# Patient Record
Sex: MALE | Race: WHITE
[De-identification: names, ages, dates, MRNs, and addresses within clinical notes are randomized per-mention and may not be internally consistent; named-entity substitution may affect disease eponyms.]

---

## 2018-06-13 ENCOUNTER — HOSPITAL ENCOUNTER (OUTPATIENT)
Dept: HOSPITAL 75 - RAD | Age: 28
End: 2018-06-13
Attending: NURSE PRACTITIONER
Payer: MEDICAID

## 2018-06-13 DIAGNOSIS — N50.82: Primary | ICD-10-CM

## 2018-06-13 PROCEDURE — 76870 US EXAM SCROTUM: CPT

## 2018-06-13 NOTE — DIAGNOSTIC IMAGING REPORT
INDICATION: 

Right scrotal pain.



TECHNIQUE:  

Real-time, grayscale, color-flow, and duplex Doppler evaluation

of the scrotum was performed.



FINDINGS:

The right testicle measures 2.5 x 0.9 x 1.7 cm and the left

testicle measures 2.5 x 1.2 x 1.8 cm. Both testes demonstrate

fairly homogeneous echotexture. No discrete testicular mass is

identified. There is normal vascularity to both testes. This is

likely a hernia in the right inguinal region containing fat. No

definite herniated bowel loops are seen. No hydrocele is

detected. The epididymides are unremarkable.



IMPRESSION:

1. No evidence of testicular mass or vascular compromise.

2. Findings are suggestive of a right inguinal hernia containing

fat.



Dictated by: 



  Dictated on workstation # [id number]

## 2018-07-06 ENCOUNTER — HOSPITAL ENCOUNTER (OUTPATIENT)
Dept: HOSPITAL 75 - PREOP | Age: 28
End: 2018-07-06
Attending: SURGERY
Payer: MEDICAID

## 2018-07-06 VITALS — BODY MASS INDEX: 37.51 KG/M2 | HEIGHT: 76 IN | WEIGHT: 308 LBS

## 2018-07-06 DIAGNOSIS — K40.30: ICD-10-CM

## 2018-07-06 DIAGNOSIS — Z01.818: Primary | ICD-10-CM

## 2018-07-09 ENCOUNTER — HOSPITAL ENCOUNTER (OUTPATIENT)
Dept: HOSPITAL 75 - SDC | Age: 28
Discharge: HOME | End: 2018-07-09
Attending: SURGERY
Payer: MEDICAID

## 2018-07-09 VITALS — SYSTOLIC BLOOD PRESSURE: 125 MMHG | DIASTOLIC BLOOD PRESSURE: 80 MMHG

## 2018-07-09 VITALS — SYSTOLIC BLOOD PRESSURE: 117 MMHG | DIASTOLIC BLOOD PRESSURE: 62 MMHG

## 2018-07-09 VITALS — SYSTOLIC BLOOD PRESSURE: 137 MMHG | DIASTOLIC BLOOD PRESSURE: 98 MMHG

## 2018-07-09 VITALS — WEIGHT: 308 LBS | BODY MASS INDEX: 37.51 KG/M2 | HEIGHT: 76 IN

## 2018-07-09 VITALS — SYSTOLIC BLOOD PRESSURE: 122 MMHG | DIASTOLIC BLOOD PRESSURE: 90 MMHG

## 2018-07-09 DIAGNOSIS — Z79.899: ICD-10-CM

## 2018-07-09 DIAGNOSIS — E66.01: ICD-10-CM

## 2018-07-09 DIAGNOSIS — K40.30: Primary | ICD-10-CM

## 2018-07-09 DIAGNOSIS — J45.909: ICD-10-CM

## 2018-07-09 DIAGNOSIS — D17.6: ICD-10-CM

## 2018-07-09 LAB
BASOPHILS # BLD AUTO: 0.1 10^3/UL (ref 0–0.1)
BASOPHILS NFR BLD AUTO: 1 % (ref 0–10)
EOSINOPHIL # BLD AUTO: 0.2 10^3/UL (ref 0–0.3)
EOSINOPHIL NFR BLD AUTO: 2 % (ref 0–10)
ERYTHROCYTE [DISTWIDTH] IN BLOOD BY AUTOMATED COUNT: 13.8 % (ref 10–14.5)
HCT VFR BLD CALC: 42 % (ref 40–54)
HGB BLD-MCNC: 13.4 G/DL (ref 13.3–17.7)
LYMPHOCYTES # BLD AUTO: 2.2 X 10^3 (ref 1–4)
LYMPHOCYTES NFR BLD AUTO: 24 % (ref 12–44)
MANUAL DIFFERENTIAL PERFORMED BLD QL: NO
MCH RBC QN AUTO: 27 PG (ref 25–34)
MCHC RBC AUTO-ENTMCNC: 32 G/DL (ref 32–36)
MCV RBC AUTO: 85 FL (ref 80–99)
MONOCYTES # BLD AUTO: 0.9 X 10^3 (ref 0–1)
MONOCYTES NFR BLD AUTO: 9 % (ref 0–12)
NEUTROPHILS # BLD AUTO: 5.9 X 10^3 (ref 1.8–7.8)
NEUTROPHILS NFR BLD AUTO: 64 % (ref 42–75)
PLATELET # BLD: 292 10^3/UL (ref 130–400)
PMV BLD AUTO: 10 FL (ref 7.4–10.4)
RBC # BLD AUTO: 4.93 10^6/UL (ref 4.35–5.85)
WBC # BLD AUTO: 9.2 10^3/UL (ref 4.3–11)

## 2018-07-09 PROCEDURE — 85025 COMPLETE CBC W/AUTO DIFF WBC: CPT

## 2018-07-09 PROCEDURE — 94664 DEMO&/EVAL PT USE INHALER: CPT

## 2018-07-09 PROCEDURE — 82962 GLUCOSE BLOOD TEST: CPT

## 2018-07-09 PROCEDURE — 36415 COLL VENOUS BLD VENIPUNCTURE: CPT

## 2018-07-09 PROCEDURE — 87081 CULTURE SCREEN ONLY: CPT

## 2018-07-09 RX ADMIN — SODIUM CHLORIDE, SODIUM LACTATE, POTASSIUM CHLORIDE, AND CALCIUM CHLORIDE PRN MLS/HR: 600; 310; 30; 20 INJECTION, SOLUTION INTRAVENOUS at 08:00

## 2018-07-09 RX ADMIN — SODIUM CHLORIDE, SODIUM LACTATE, POTASSIUM CHLORIDE, AND CALCIUM CHLORIDE PRN MLS/HR: 600; 310; 30; 20 INJECTION, SOLUTION INTRAVENOUS at 13:10

## 2018-07-09 RX ADMIN — SODIUM CHLORIDE, SODIUM LACTATE, POTASSIUM CHLORIDE, AND CALCIUM CHLORIDE PRN MLS/HR: 600; 310; 30; 20 INJECTION, SOLUTION INTRAVENOUS at 09:50

## 2018-07-09 NOTE — XMS REPORT
[facility]

 Created on: 08/15/2016



SadiaKatianah

External Reference #: [id number]

: 1990

Sex: Male



Demographics







 Address  [address] [place]  [address]

 

 Home Phone  [phone]

 

 Preferred Language  Unknown

 

 Marital Status  Unknown

 

 [employer] Affiliation  Unknown

 

 Race  Unreported/Refused to Report

 

 Ethnic Group  Not  or 





Author







 Author  LIGIA GONZALEZ

 

 [facility]  eClinicalWorks

 

 Address  Unknown

 

 Phone  Unavailable







Care Team Providers







 Care Team Member Name  Role  Phone

 

 LIGIA GONZALEZ  CP  Unavailable



                                                                



Allergies

          No Known Allergies                                                   
                                     



Problems

          





 Problem Type  Condition  Code  Onset Dates  Condition Status

 

 Problem  Pleurisy without mention of effusion or current tuberculosis  511.0  
   Active

 

 Problem  Asthma, unspecified, unspecified status  493.90     Active

 

 Problem  Routine general medical examination at health care facility  V70.0   
  Active

 

 Problem  Dysthymia  F34.1     Active

 

 Problem  Gastroesophageal reflux disease without esophagitis  K21.9     Active

 

 Problem  Mild persistent asthma without complication  J45.30     Active

 

 Problem  Encounter for dental examination  Z01.20     Active

 

 Problem  Contact dermatitis and other eczema, due to unspecified cause  692.9 
    Active

 

 Problem  Right foot pain  M79.671     Active

 

 Problem  Over weight  E66.3     Active

 

 Problem  Other general medical examination for administrative purposes  V70.3 
    Active

 

 Problem  Need for prophylactic vaccination and inoculation, Influenza  V04.81 
    Active

 

 Problem  Other follow-up examination  V67.59     Active



                                                                               
                                                                               
                                                  



Medications

          





 Medication  Code System  Code  Instructions  Start Date  End Date  Status  
Dosage

 

 Acanya  NDC  [address]-44  1.2-2.5 % Externally twice a day  Aug 15, 2016    
    1 application to affected area



                                                                              



Results

          No Known Results                                                     
               



Summary Purpose

          eClinicalWorks Submission

## 2018-07-09 NOTE — XMS REPORT
[facility]

 Created on: 2018



Sadia, Seth

External Reference #: [id number]

: 1990

Sex: Male



Demographics







 Address  [address] [place]  [address]

 

 Preferred Language  Unknown

 

 Marital Status  Unknown

 

 [employer] Affiliation  Unknown

 

 Race  Unknown

 

 Ethnic Group  Unknown





Author







 Author  TESHA RAMIREZ

 

 [facility]

 

 Address  [address] [place]  [address]



 

 Phone  Unavailable







Care Team Providers







 Care Team Member Name  Role  Phone

 

 TESHA RAMIREZ  Unavailable  Unavailable







PROBLEMS







 Type  Condition  ICD9-CM Code  AGB12-ZJ Code  Onset Dates  Condition Status  
SNOMED Code

 

 Problem  Dysthymia     F34.1     Active  19387494

 

 Problem  Right foot pain     M79.671     Active  73527784

 

 Problem  Over weight     E66.3     Active  [id number]

 

 Problem  Gastroesophageal reflux disease without esophagitis     K21.9     
Active  [id number]

 

 Problem  Mild persistent asthma without complication     J45.30     Active  
[id number]







ALLERGIES







 Substance  Reaction  Event Type  Date  Status

 

 Penicillin V Potassium  anaphylaxis  Drug Allergy  05 Dec, 2017  Active







ENCOUNTERS







 Encounter  Location  Date  Diagnosis

 

 [facility]  [id number] [place] [address] [id number]     

 

 [address] [id number]  30 May, 
2018  Diarrhea of presumed infectious origin R19.7

 

 [facility]  [id number] [place] [address] 
[id number]  10 Apr, 2018  Encounter for dental exam and cleaning w/o abnormal 
findings Z01.20

 

 [facility]  [address] [place] [address] [id number]    Acute nasopharyngitis J00

 

 [facility]  [id number] [place] [address] [id number]  13 2018  Mild persistent asthma without complication J45.30 ; Dysthymia F34.1 and 
Gastroesophageal reflux disease without esophagitis K21.9

 

 [address] [id number]  08 2018  Right foot pain M79.671 ; Encounter for screening for lipoid disorders 
Z13.220 and Encounter for screening for cardiovascular disorders Z13.6

 

 [id number] [place] [phone]  15 Dionte, 
2018   

 

 [facility]  [address] [id number][place] [phone]  15 [address], 
2018   

 

 [facility]  [address] [id number] [place] [phone]  
05 Dec, 2017  Dental examination Z01.20

 

 [facility]  [address] [id number], [place] 
[id number]  05 Dec, 2017  Dental examination Z01.20

 

 [facility]  [address] [id number], [place] [id number]    Encounter for immunization Z23

 

 [facility]  [address] [id number] [place] [phone]-
[phone]  16 Aug, 2017   

 

 [facility]  [address] [id number], [place] 
[phone]  15 Aug, 2017  Encounter for dental examination and cleaning without 
abnormal findings Z01.20

 

 [facility]  [address]00565100[place] [address]-
[address]  10 Aug, 2017   

 

 [facility]  [address] [id number], [place] [phone]  10 Aug, 
2017  Right foot pain M79.671 ; Dysthymia F34.1 ; Mild persistent asthma 
without complication J45.30 ; Gastroesophageal reflux disease without 
esophagitis K21.9 ; Encounter for screening for lipoid disorders Z13.220 ; 
Encounter for screening for cardiovascular disorders Z13.6 and Acne vulgaris 
L70.0

 

 [facility] [address]ANG[address] [id number] Rapids[place] [id number]  
  Dental examination Z01.20

 

 [facility]  [address] [id number] Rapids, [place] [phone]  
24 May, 2017  Dental examination Z01.20

 

 [facility]  [address] [id number], [place] 
[id number]  24 May, 2017  Encounter for dental examination and cleaning without 
abnormal findings Z01.20

 

 [facility]  [address] [id number], [place] [phone]    Dysthymia F34.1 ; Mild persistent asthma without complication J45.30 and 
Gastroesophageal reflux disease without esophagitis K21.9

 

 [facility]  [address] [id number], [place] 
[phone]    Encounter for dental examination and cleaning without 
abnormal findings Z01.20

 

 [facility]  [address] [id number], [place] [phone]    Physical exam, routine Z00.00

 

 [facility]  [address] [id number] [place] [phone]    Dysthymia F34.1

 

 [facility]  [address] [id number] [place] [address]  
20 Dec, 2016  Dental examination Z01.20

 

 [facility]  [address] [id number] [place] [address] [phone]  01 Dec, 
2016   

 

 [facility]  [address] [id number], [place] 
[id number]  27 Oct, 2016  Encounter for dental examination and cleaning without 
abnormal findings Z01.20

 

 [facility]  [address] [id number] Rapids[place] [phone]  
27 Oct, 2016  Encounter for dental examination Z01.20

 

 [facility]  [address] [id number] [place] [address] [id number]  20 Oct, 
2016  Encounter for immunization Z23

 

 [facility]  [address] [id number] [place] [address] [id number]  19 Oct, 
2016   

 

 [facility]  [address] [id number] [place] [address] [id number]  18 Oct, 
2016  Right foot pain M79.671

 

 [facility]  [address] [id number] [place] [phone]  15 Aug, 
2016   

 

 [facility]  [address] [id number] [place] [address] [id number]  10 Aug, 
2016  Dysthymia F34.1

 

 [facility]  [address] [id number] [place] [id number]  08 Aug, 
2016  Mild persistent asthma without complication J45.30 ; Dysthymia F34.1 ; 
Gastroesophageal reflux disease without esophagitis K21.9 ; Right foot pain 
M79.671 and Over weight E66.3

 

 [facility]  [address] [id number], [place] [phone]  05 Aug, 
2016   

 

 [facility]  [address] [id number], [place] [phone]  04 Aug, 
2016   

 

 [facility]  [address] [id number], [place] [phone]     

 

 [facility]  [address] [id number], [place] 
[id number]    Encounter for dental examination and cleaning without 
abnormal findings Z01.20

 

 [facility]  [address] [id number], [place] [phone]     

 

 [facility]  [id number] [place] [phone]  
  Dental examination Z01.20

 

 [facility]  [id number] [place] [address] [id number]  
  Dental examination Z01.20

 

 [facility]  [address] [id number], [place] 
[id number]    Encounter for dental examination and cleaning with 
abnormal findings Z01.21

 

 [facility]  [address] [id number], [place] [phone]  09 Mar, 
2016   

 

 [facility]  [address] [id number], [place] [phone]     

 

 [facility]  [address] [id number], [place] [phone]    Elevated fasting glucose R73.01

 

 [facility]  [address] [id number], [place] [phone]  10 Feb, 
2016  Elevated fasting glucose R73.01

 

 [facility]  [address] [id number], [place] [phone]  08 2016  Non morbid obesity due to excess calories E66.09 and Weight increase R63.5

 

 [facility]  [address] [id number], [place] [address]  05 2016  High risk medication use Z79.899 ; Wellness examination Z00.00 and 
Encounter for immunization Z23

 

 [facility]  [address] [id number], [place] [phone]     

 

 [facility]  [address] [id number], [place] [phone]  21 Dec, 
2015   

 

 [facility]  [address] [facility] [id number][place] [address]     

 

 [facility]  [address] [id number], [place] [phone]     

 

 [facility]  [address] [id number], [place] [phone]  29 Oct, 
2015   

 

 [facility]  [address] [id number] Rapids, [place] [id number]  
29 Oct, 2015  Dental examination Z01.20

 

 [facility]  [address] [id number] Rapids, [place] [phone]  
28 Sep, 2015  Dental examination V72.2

 

 [facility]  [address] [id number], [place] [phone]  28 Aug, 
2015   

 

 [facility]  [address] [id number], [place] [phone]  27 Aug, 
2015  Tinea corporis 110.5

 

 [facility]  [address] [id number], [place] [phone]  04 Aug, 
2015   

 

 [facility]  [address] [id number], [place] [phone]     

 

 [facility]  [address] [id number], [place] [id number]     

 

 [facility]  [address] [id number], [place] [id number]  27 May, 
2015   

 

 [facility]  [address] [id number], [place] [id number]  05 May, 
2015   

 

 [facility]  [address] [id number], [place] [phone]  05 May, 
2015   

 

 [facility]  [address] [facility] [id number], [place] [address]23688-
[address]     

 

 [facility]  [address] [facility] [id number], [place] [phone]-
4[address]     

 

 [facility]  [address] [id number], [place] [phone]  10 Mar, 
2015   

 

 [facility] [place] [facility]  [address] [id number] [place] [address]-
[address]  10 Mar, 2015   

 

 [facility]  [address] [id number] [place] [phone]     

 

 [facility] [place] [facility]  [address] [facility] [id number] [place] [address]-
9376     

 

 [facility]  [address] [id number], [place] [id number]     

 

 [facility] [place] [facility]  [address] [facility] [id number] [place] [address]-
6106     

 

 [facility]  [address] [id number] [place] [phone]  22 Dec, 
2014   

 

 [facility] [place] [facility]  [address] [facility] [id number][place] [address]-
[address]4  22 Dec, 2014   

 

 [facility]  [address] [id number], [place] [phone]     

 

 [facility] [place] [facility]  [address] [facility] [id number] [address]-
3283     

 

 [facility] [place] [facility]  [address] [facility] [id number][place] [address]-
2854  22 Oct, 2014   

 

 [facility]  [address] [id number], [place] [phone]  22 Oct, 
2014   

 

 [facility] [place] [facility]  [address] [facility] [id number][place] [address]-
1746  15 Oct, 2014   

 

 [facility]  [address] [id number], [place] [phone]  15 Oct, 
2014   

 

 [facility]  [address] [id number] [place] [phone]     

 

 [facility] [place] [facility]  [address] [facility] [id number] [address]-
7276     

 

 [facility]  [address] [id number] [place] [phone]     

 

 [facility] [place] [facility]  [address] [facility] [id number][place] [address]-
4766     

 

 [facility]  [address] [id number] [place] [address]  27 May, 
2014   

 

 [facility] [place] [facility]  [address] [facility] [id number][place] [address]-
[address]6  27 May, 2014   

 

 [facility]  [address] [facility] [id number] [place] [address]  07 May, 
2014   

 

 [facility] [place] [facility]  [address] [facility] [id number][place] [address]-
1616  07 May, 2014   

 

 [facility]  [address] [id number] [place] [phone]     

 

 [facility] [place] [facility]  [address] [facility] [id number][place] [address]-
6816     

 

 [facility]  [address] [facility] [id number] [place] [address]     

 

 [facility] [place] [facility]  [address] [facility] [id number][place] [address]-
3482     

 

 [facility]  [address] [facility] [id number] [place] [phone]     

 

 [facility] [place] [facility]  [address] [facility] [id number][place] [address]-
4425     

 

 [facility]  [address] [facility] [id number] [place] [phone]  13 Dec, 
2013   

 

 [facility] [place] [facility]  [address] [facility] [id number][place] [address]-
1276  13 Dec, 2013   

 

 [facility]  [address] [facility] [id number], [place] [phone]  02 Dec, 
2013   

 

 [facility] [place] [facility]  [address] [facility] [id number][place] [address]-
3826  02 Dec, 2013   

 

 [facility]  [address] [facility] [id number] [place] [phone]  30 Oct, 
2013   

 

 [facility] [place] [facility]  [address] [facility] [id number][place] [address]-
7323  30 Oct, 2013   

 

 [facility]  [address] [id number] [place] [phone]  28 Oct, 
2013   

 

 [facility] [place] [facility]  [address] [facility] [id number] [place] [address]
2546  28 Oct, 2013   

 

 [facility] [place] [facility]  [address] [facility] [id number][place] [address]-
2546  18 Oct, 2013   

 

 [facility]  [address] [id number] [place] [id number]  18 Oct, 
2013   

 

 [facility] [place] [facility]  [address] [facility] [id number][place] [address]-
2546  15 Oct, 2013   

 

 [facility]  [address] [id number] [place] [phone]  15 Oct, 
2013   

 

 [facility]  [address] [id number] [place] [phone]  05 Sep, 
2013   

 

 [facility]  [address] [id number] [place] [phone]  16 Aug, 
2013   

 

 [facility]  [address] [id number] [place] [address]  12 Aug, 
2013   

 

 [facility] [place] [facility]  [address] [facility] [id number][place] [address]-
8179     

 

 [facility] [place] [facility]  [address] [facility] [id number][place] [address]-
2516  24 Dec, 2010   

 

 [facility] [place] [facility]  [address] [id number] [address]-
7465  15 Nov, 2010   

 

 [facility] [place] [facility]  [address] [facility] [id number][place] [address]-
3976  27 Oct, 2010   

 

 [facility] [place] [facility]  [address] [facility] [id number][place] [address]-
6396  19 Oct, 2010   

 

 [facility] [place] [facility]  [address] [facility] [id number][place] [address]-
6496  19 Oct, 2010   

 

 [facility] [place] [facility]  [address] [facility] [id number][place] [address]-
8806  11 Oct, 2010   

 

 [facility] [place] [facility]  [address] [facility] [id number][place] [address]-
8761  15 Dec, 2009   

 

 [facility]  [address] [facility] [id number][place] [address]-
7410  15 Dec, 2009   

 

 [facility]  [address] [facility] [id number][place] [address]-
3893  14 Dec, 2009   

 

 [facility]  [address] [facility] [id number][place] [address]-
8590  07 Dec, 2009   







IMMUNIZATIONS

No Known Immunizations



SOCIAL HISTORY

Never Assessed



REASON FOR VISIT

ADULT OUTREACH CLASS [facility]



PLAN OF CARE







 Activity  Details









  









 Follow Up  3 Months Reason:ON SITE RECALL







VITAL SIGNS





MEDICATIONS







 Medication  Instructions  Dosage  Frequency  Start Date  End Date  Duration  
Status

 

 Risperidone 1 MG  Orally Once a day at bedtime  1 Tablet              Active

 

 Acetaminophen 500 mg  Orally 3 times a day  2 capsules as needed  8h  10 Aug, 
2017        Active

 

 Wellbutrin  mg  Orally Once a day in the morning  1 tablet           30  
Active

 

 Omeprazole 20 mg  Orally Once a day, NEEDS APPT BEFORE ANY FURTHER REFILLS  1 
tablet              Active

 

 Meloxicam 15 MG  Orally Once a day, NEEDS APPT BEFORE ANY FURTHER REFILLS  
take 1 tablet           90  Active

 

 ProAir  (90 Base) mcg/act     inhale 2 puffs by Inhalation route every 
6 hours as needed  PRN shortness of breath/cough     15 Oct, 2014        Active

 

 Advair Diskus 250-50 mcg/dose     INHALE ONE (1) PUFF BY MOUTH TWICE DAILY (
APPROX. EVERY 12 HRS)              Active

 

 Loratadine 10 mg  Orally Once a day  1 tablet  24h        90  Active

 

 Epiduo 0.1-2.5 %  Externally 2 times a day  as directed  12h        30  Active







RESULTS

No Results



PROCEDURES







 Procedure  Date Ordered  Result  Body Site

 

 PROPHYLAXIS - ADULT  Dec 05, 2017      

 

 TOPICAL FLUORIDE VARNISH  Dec 05, 2017      







INSTRUCTIONS





MEDICATIONS ADMINISTERED

No Known Medications



MEDICAL (GENERAL) HISTORY







 Type  Description  Date

 

 Medical History  seasonal allergies   

 

 Medical History  acid reflux   

 

 Medical History  anxiety   

 

 Medical History  depression   

 

 Medical History  attention deficit hyperactivity disorder   

 

 Medical History  mild mental retardation   

 

 Medical History  Asthma   

 

 Surgical History  orthopedic surgery-right foot, corrective surgery

## 2018-07-09 NOTE — XMS REPORT
[facility]

 Created on: 10/29/2015



Saúl Mccullough

External Reference #: [id number]

: 1990

Sex: Male



Demographics







 Address  [address] [place] [address]  [address]

 

 Home Phone  [phone]

 

 Preferred Language  Unknown

 

 Marital Status  Unknown

 

 L[employer] Affiliation  Unknown

 

 Race  Unreported/Refused to Report

 

 Ethnic Group  Not  or 





Author







 Author  LIGIA GONZALEZ

 

 [facility]  eClinicalWorks

 

 Address  Unknown

 

 Phone  Unavailable







Care Team Providers







 Care Team Member Name  Role  Phone

 

 LIGIA GONZALEZ  CP  Unavailable



                                                                



Allergies

          No Known Allergies                                                   
                                     



Problems

          





 Problem Type  Condition  Code  Onset Dates  Condition Status

 

 Problem  Other general medical examination for administrative purposes  V70.3 
    Active

 

 Problem  Contact dermatitis and other eczema, due to unspecified cause  692.9 
    Active

 

 Problem  Asthma, unspecified, unspecified status  493.90     Active

 

 Problem  Encounter for dental examination  Z01.20     Active

 

 Problem  Other follow-up examination  V67.59     Active

 

 Problem  Need for prophylactic vaccination and inoculation, Influenza  V04.81 
    Active

 

 Problem  Routine general medical examination at health care facility  V70.0   
  Active

 

 Problem  Pleurisy without mention of effusion or current tuberculosis  511.0  
   Active



                                                                               
                                                                               



Medications

          





 Medication  Code System  Code  Instructions  Start Date  End Date  Status  
Dosage

 

 Wellbutrin XL  NDC  [address]-28  300 MG Orally Once a day  Oct 15, 2014     
   1 tablet in the morning



                                                                              



Results

          No Known Results                                                     
               



Summary Purpose

          eClinicalWorks Submission

## 2018-07-09 NOTE — XMS REPORT
[facility]

 Created on: 2015



Saúl Mccullough

External Reference #: [id number]

: 1990

Sex: Male



Demographics







 Address  [address] [place] [address]  [address]

 

 Home Phone  [phone]

 

 Preferred Language  Unknown

 

 Marital Status  Unknown

 

 Rastafari Affiliation  Unknown

 

 Race  Unreported/Refused to Report

 

 Ethnic Group  Not  or 





Author







 Author  LIGIA GONZALEZ

 

 [facility]  eClinicalWorks

 

 Address  Unknown

 

 Phone  Unavailable







Care Team Providers







 Care Team Member Name  Role  Phone

 

 LIGIA GONZALEZ  CP  Unavailable



                                                                



Allergies

          No Known Allergies                                                   
                                     



Problems

          





 Problem Type  Condition  Code  Onset Dates  Condition Status

 

 Problem  Other general medical examination for administrative purposes  V70.3 
    Active

 

 Problem  Contact dermatitis and other eczema, due to unspecified cause  692.9 
    Active

 

 Problem  Asthma, unspecified, unspecified status  493.90     Active

 

 Problem  Encounter for dental examination  Z01.20     Active

 

 Problem  Other follow-up examination  V67.59     Active

 

 Problem  Need for prophylactic vaccination and inoculation, Influenza  V04.81 
    Active

 

 Problem  Routine general medical examination at health care facility  V70.0   
  Active

 

 Problem  Pleurisy without mention of effusion or current tuberculosis  511.0  
   Active



                                                                               
                                                                               



Medications

          





 Medication  Code System  Code  Instructions  Start Date  End Date  Status  
Dosage

 

 Omeprazole  NDC  [address]-10  20 MG Orally Once a day  Oct 15, 2014        1 
capsule

 

 Advair Diskus  NDC  [address]-28  250-50 MCG/DOSE Inhalation Twice a day  
2015        1 puff

 

 Meloxicam  NDC  [address]-76  15 MG Orally Once a day  2014        
take 1 tablet

 

 Risperidone  NDC  [address]-71  1 MG Orally Once a day at bedtime  2015        1 Tablet

 

 Carafate  NDC  [address]-62  1 gram Orally Twice a day  Oct 15, 2014        1 
tablet



                                                                               
                                       



Results

          No Known Results                                                     
               



Summary Purpose

          eClinicalWorks Submission

## 2018-07-09 NOTE — XMS REPORT
[facility]

 Created on: 08/15/2016



Saúl Mccullough

External Reference #: [id number]

: 1990

Sex: Male



Demographics







 Address  [address] [place]  [address]

 

 Home Phone  [phone]

 

 Preferred Language  Unknown

 

 Marital Status  Unknown

 

 Gnosticist Affiliation  Unknown

 

 Race  Unreported/Refused to Report

 

 Ethnic Group  Not  or 





Author







 Author  TING CASTILLO

 

 [facility]  eClinicalWorks

 

 Address  Unknown

 

 Phone  Unavailable







Care Team Providers







 Care Team Member Name  Role  Phone

 

 TING CASTILLO  CP  Unavailable



                                                                



Allergies, Adverse Reactions, Alerts

          





 Substance  Reaction  Event Type

 

 Penicillin V Potassium  anaphylaxis  Drug Allergy



                                                                               
         



Problems

          





 Problem Type  Condition  Code  Onset Dates  Condition Status

 

 Problem  Pleurisy without mention of effusion or current tuberculosis  511.0  
   Active

 

 Problem  Asthma, unspecified, unspecified status  493.90     Active

 

 Problem  Routine general medical examination at health care facility  V70.0   
  Active

 

 Problem  Dysthymia  F34.1     Active

 

 Problem  Gastroesophageal reflux disease without esophagitis  K21.9     Active

 

 Problem  Mild persistent asthma without complication  J45.30     Active

 

 Problem  Encounter for dental examination  Z01.20     Active

 

 Problem  Contact dermatitis and other eczema, due to unspecified cause  692.9 
    Active

 

 Problem  Right foot pain  M79.671     Active

 

 Problem  Over weight  E66.3     Active

 

 Assessment  Over weight  E66.3     Active

 

 Assessment  Right foot pain  M79.671     Active

 

 Assessment  Mild persistent asthma without complication  J45.30     Active

 

 Problem  Other general medical examination for administrative purposes  V70.3 
    Active

 

 Assessment  Gastroesophageal reflux disease without esophagitis  K21.9     
Active

 

 Problem  Need for prophylactic vaccination and inoculation, Influenza  V04.81 
    Active

 

 Assessment  Dysthymia  F34.1     Active

 

 Problem  Other follow-up examination  V67.59     Active



                                                                               
                                                                               
                                                                               
                     



Medications

          





 Medication  Code System  Code  Instructions  Start Date  End Date  Status  
Dosage

 

 Benzaclin  NDC  [address]-17  1-5 % Externally 2 times a day  March 10, 2015 
       1 yadira by Topical route 2 times per day

 

 Omeprazole  NDC  [address]-60  20 mg Orally Once a day, NEEDS APPT BEFORE ANY 
FURTHER REFILLS           1 tablet

 

 ProAir HFA  NDC  [address]-50  90 mcg/actuation    Oct 15, 2014        inhale 
2 puffs by Inhalation route every 6 hours as needed  PRN shortness of breath/
cough

 

 Wellbutrin XL  NDC  [address]-01  300 MG Orally Once a day  Oct 15, 2014     
   1 tablet in the morning

 

 Meloxicam  NDC  40598163024  15 MG Orally Once a day, NEEDS APPT BEFORE ANY 
FURTHER REFILLS     2016     take 1 tablet

 

 Advair Diskus  NDC  36606858802  250-50 MCG/DOSE Inhalation Twice a day, NEEDS 
APPT BEFORE ANY FURTHER REFILLS           1 puff

 

 Claritin  NDC  [address]-30  10 MG Orally Once a day           1 tablet

 

 Loratadine  NDC  58838389184  10 MG Orally Once a day           1 tablet

 

 Sucralfate  NDC  [address]-58  1 GM/10ML Orally Twice a day           10 ml

 

 Risperidone  NDC  66436996445  1 MG Orally Once a day at bedtime, NEEDS APPT 
BEFORE ANY FURTHER REFILLS           1 Tablet

 

 Ibuprofen  NDC  [address]-52  600 MG Orally every 6 hrs as needed  Teri 15, 
2014        take 1 tablet



                                                                               
                                                                               
                              



Procedures

          





 Procedure  Coding System  Code  Date

 

 Office Visit, Est Pt., Level 3  CPT-4  25888  Aug 08, 2016



                                                                               
                   



Vital Signs

          





 Date/Time:  Aug 08, 2016

 

 Cardiac Monitoring Heart Rate  82 bpm

 

 Weight  282.3 lbs

 

 Height  75 in

 

 BMI  35.28 Index

 

 Blood Pressure Diastolic  70 mmHg

 

 Blood Pressure Systolic  124 mmHg



                                                                              



Results

          No Known Results                                                     
               



Summary Purpose

          eClinicalWorks Submission

## 2018-07-09 NOTE — XMS REPORT
[facility]

 Created on: 10/20/2016



Sadia Saúl

External Reference #: [id number]

: 1990

Sex: Male



Demographics







 Address  [address] [place]  [address]

 

 Home Phone  [phone]

 

 Preferred Language  Unknown

 

 Marital Status  Unknown

 

 Episcopal Affiliation  Unknown

 

 Race  Unreported/Refused to Report

 

 Ethnic Group  Not  or 





Author







 Author  ANJALI CONWAY

 

 Organization  eClinicalWorks

 

 Address  Unknown

 

 Phone  Unavailable







Care Team Providers







 Care Team Member Name  Role  Phone

 

 ANJALI CONWAY  CP  Unavailable



                                                                



Allergies

          No Known Allergies                                                   
                                     



Problems

          





 Problem Type  Condition  Code  Onset Dates  Condition Status

 

 Problem  Pleurisy without mention of effusion or current tuberculosis  511.0  
   Active

 

 Problem  Asthma, unspecified, unspecified status  493.90     Active

 

 Problem  Routine general medical examination at health care facility  V70.0   
  Active

 

 Problem  Dysthymia  F34.1     Active

 

 Problem  Gastroesophageal reflux disease without esophagitis  K21.9     Active

 

 Problem  Mild persistent asthma without complication  J45.30     Active

 

 Problem  Encounter for dental examination  Z01.20     Active

 

 Problem  Contact dermatitis and other eczema, due to unspecified cause  692.9 
    Active

 

 Problem  Right foot pain  M79.671     Active

 

 Problem  Over weight  E66.3     Active

 

 Assessment  Encounter for immunization  Z23     Active

 

 Problem  Other general medical examination for administrative purposes  V70.3 
    Active

 

 Problem  Need for prophylactic vaccination and inoculation, Influenza  V04.81 
    Active

 

 Problem  Other follow-up examination  V67.59     Active



                                                                               
                                                                               
                                                            



Medications

          No Known Medications                                                 
                                       



Procedures

          





 Procedure  Coding System  Code  Date

 

 SINGLE IMMUNIZATION ADMIN  CPT-4  67642  Oct 20, 2016

 

 FLUARIX QUAD P-FREE 3 AND UP .50   CPT-4  72524  Oct 20, 2016



                                                                               
         



Results

          No Known Results                                                     
                                   



Immunizations

          





 Vaccine  Administration Date

 

 FLUZONE QUAD 3 AND UP 0.50 2016  Oct 20, 2016



                                                                    



Summary Purpose

          eClinicalWorks Submission

## 2018-07-09 NOTE — XMS REPORT
[facility]

 Created on: 2017



Saúl Mccullough

External Reference #: [id number]

: 1990

Sex: Male



Demographics







 Address  [address] [place]  [address]

 

 Preferred Language  Unknown

 

 Marital Status  Unknown

 

 Anabaptist Affiliation  Unknown

 

 Race  Unknown

 

 Ethnic Group  Unknown





Author







 Author  TING CASTILLO

 

 [facility]

 

 Address  [address] [place]  [address]



 

 Phone  [phone]







Care Team Providers







 Care Team Member Name  Role  Phone

 

 TING CASTILLO  Unavailable  [phone]







PROBLEMS







 Type  Condition  ICD9-CM Code  GSI61-GI Code  Onset Dates  Condition Status  
SNOMED Code

 

 Problem  Right foot pain     M79.671     Active  03017712

 

 Problem  Gastroesophageal reflux disease without esophagitis     K21.9     
Active  002345797

 

 Problem  Dysthymia     F34.1     Active  51889839

 

 Problem  Encounter for dental examination     Z01.20     Active  857315032

 

 Problem  Mild persistent asthma without complication     J45.30     Active  
148913410

 

 Problem  Over weight     E66.3     Active  705395763







ALLERGIES

Unknown Allergies



SOCIAL HISTORY

No smoking Hx information available



PLAN OF CARE





VITAL SIGNS





MEDICATIONS







 Medication  Instructions  Dosage  Frequency  Start Date  End Date  Duration  
Status

 

 Wellbutrin  MG  Orally Once a day in the morning  1 tablet     15 Oct, 
2014     30 days  Active

 

 Loratadine 10 mg  Orally Once a day  1 tablet  24h        30 days  Active







RESULTS

No Results



PROCEDURES

No Known procedures



IMMUNIZATIONS

No Known Immunizations

## 2018-07-09 NOTE — XMS REPORT
[facility]

 Created on: 2015



Sadia Saúl

External Reference #: [id number]

: 1990

Sex: Male



Demographics







 Address  [address] [place] [address]  [address]

 

 Home Phone  [phone]

 

 Preferred Language  Unknown

 

 Marital Status  Unknown

 

 Confucianist Affiliation  Unknown

 

 Race  Unreported/Refused to Report

 

 Ethnic Group  Not  or 





Author







 Author  LIGIA GONZALEZ

 

 [facility]  eClinicalWorks

 

 Address  Unknown

 

 Phone  Unavailable







Care Team Providers







 Care Team Member Name  Role  Phone

 

 LIGIA GONZALEZ  CP  Unavailable



                                                                



Allergies

          No Known Allergies                                                   
                                     



Problems

          





 Problem Type  Condition  Code  Onset Dates  Condition Status

 

 Problem  Other general medical examination for administrative purposes  V70.3 
    Active

 

 Problem  Contact dermatitis and other eczema, due to unspecified cause  692.9 
    Active

 

 Problem  Asthma, unspecified, unspecified status  493.90     Active

 

 Problem  Encounter for dental examination  Z01.20     Active

 

 Problem  Other follow-up examination  V67.59     Active

 

 Problem  Need for prophylactic vaccination and inoculation, Influenza  V04.81 
    Active

 

 Problem  Routine general medical examination at health care facility  V70.0   
  Active

 

 Problem  Pleurisy without mention of effusion or current tuberculosis  511.0  
   Active



                                                                               
                                                                               



Medications

          





 Medication  Code System  Code  Instructions  Start Date  End Date  Status  
Dosage

 

 Loratadine  NDC  [address]-10  10 MG Orally Once a day  Dec 22, 2014        1 
tablet



                                                                              



Results

          No Known Results                                                     
               



Summary Purpose

          eClinicalWorks Submission

## 2018-07-09 NOTE — XMS REPORT
[facility]

 Created on: 2016



Saúl Mccullough

External Reference #: [id number]

: 1990

Sex: Male



Demographics







 Address  [address] [place] [address]  [address]

 

 Home Phone  [phone]

 

 Preferred Language  Unknown

 

 Marital Status  Unknown

 

 Baptist Affiliation  Unknown

 

 Race  Unreported/Refused to Report

 

 Ethnic Group  Not  or 





Author







 TESHA Mcconnell

 

 [facility]  eClinicalWorks

 

 Address  Unknown

 

 Phone  Unavailable







Care Team Providers







 Care Team Member Name  Role  Phone

 

 TESHA RAMIREZ  CP  Unavailable



                                                                



Allergies, Adverse Reactions, Alerts

          





 Substance  Reaction  Event Type

 

 Penicillin V Potassium  anaphylaxis  Drug Allergy



                                                                               
         



Problems

          





 Problem Type  Condition  Code  Onset Dates  Condition Status

 

 Problem  Pleurisy without mention of effusion or current tuberculosis  511.0  
   Active

 

 Problem  Asthma, unspecified, unspecified status  493.90     Active

 

 Problem  Routine general medical examination at health care facility  V70.0   
  Active

 

 Problem  Dysthymia  F34.1     Active

 

 Problem  Gastroesophageal reflux disease without esophagitis  K21.9     Active

 

 Problem  Mild persistent asthma without complication  J45.30     Active

 

 Problem  Encounter for dental examination  Z01.20     Active

 

 Problem  Contact dermatitis and other eczema, due to unspecified cause  692.9 
    Active

 

 Problem  Right foot pain  M79.671     Active

 

 Problem  Over weight  E66.3     Active

 

 Assessment  Encounter for dental examination and cleaning without abnormal 
findings  Z01.20     Active

 

 Problem  Other general medical examination for administrative purposes  V70.3 
    Active

 

 Problem  Need for prophylactic vaccination and inoculation, Influenza  V04.81 
    Active

 

 Problem  Other follow-up examination  V67.59     Active



                                                                               
                                                                               
                                                            



Medications

          





 Medication  Code System  Code  Instructions  Start Date  End Date  Status  
Dosage

 

 Claritin  NDC  [address]-59  10 MG Orally Once a day           1 tablet

 

 Wellbutrin XL  NDC  [address]-13  300 MG Orally Once a day  Oct 15, 2014     
   1 tablet in the morning

 

 Wellbutrin XL  NDC  19691680325  300 MG Orally Once a day           1 tablet 
in the morning

 

 Risperidone  NDC  65395027282  1 MG Orally Once a day at bedtime           1 
Tablet

 

 Advair Diskus  NDC  37845227537  250-50 MCG/DOSE             INHALE ONE (1) 
PUFF BY MOUTH TWICE DAILY (APPROX. EVERY 12 HRS)

 

 Advair Diskus  NDC  97400779514  250-50 MCG/DOSE Inhalation Twice a day, NEEDS 
APPT BEFORE ANY FURTHER REFILLS           1 puff

 

 Ibuprofen  NDC  [address]-28  600 MG Orally 2 times a day PRN  Teri 15, 2014 
       1 tablet

 

 Meloxicam  NDC  13828326135  15 MG Orally Once a day, NEEDS APPT BEFORE ANY 
FURTHER REFILLS     2016     take 1 tablet

 

 Acanya  NDC  [address]-23  1.2-2.5 % Externally twice a day  Aug 15, 2016    
    1 application to affected area

 

 Sucralfate  NDC  [address]-60  1 GM/10ML Orally Twice a day           10 ml

 

 Loratadine  NDC  75474876529  10 MG Orally Once a day           1 tablet

 

 ProAir HFA  NDC  [address]-43  90 mcg/actuation    Oct 15, 2014        inhale 
2 puffs by Inhalation route every 6 hours as needed  PRN shortness of breath/
cough

 

 Omeprazole  NDC  [address]-17  20 mg Orally Once a day, NEEDS APPT BEFORE ANY 
FURTHER REFILLS           1 tablet



                                                                               
                                                                               
                                                  



Procedures

          





 Procedure  Coding System  Code  Date

 

 TOPICAL FLUORIDE VARNISH  CPT-4    Oct 27, 2016

 

 PROPHYLAXIS - ADULT  CPT-4    Oct 27, 2016



                                                                               
         



Results

          No Known Results                                                     
               



Summary Purpose

          eClinicalWorks Submission

## 2018-07-09 NOTE — ANESTHESIA-GENERAL POST-OP
General


Patient Condition


Mental Status/LOC:  Same as Preop


Cardiovascular:  Satisfactory


Nausea/Vomiting:  Absent


Respiratory:  Satisfactory


Pain:  Controlled


Complications:  Absent





Post Op Complications


Complications


None





Follow Up Care/Instructions


Patient Instructions


None needed.





Anesthesia/Patient Condition


Patient Condition


Patient is doing well, no complaints, stable vital signs, no apparent adverse 

anesthesia problems.   


No complications reported per nursing.











MIRIAN ROMERO CRNA Jul 9, 2018 15:49

## 2018-07-09 NOTE — XMS REPORT
[facility]

 Created on: 08/10/2016



SadiaSaúl

External Reference #: [id number]

: 1990

Sex: Male



Demographics







 Address  [address] [place] [address]  [address]

 

 Home Phone  [phone]

 

 Preferred Language  Unknown

 

 Marital Status  Unknown

 

 Scientologist Affiliation  Unknown

 

 Race  Unreported/Refused to Report

 

 Ethnic Group  Not  or 





Author







 Author  TING CASTILLO

 

 Organization  eClinicalWorks

 

 Address  Unknown

 

 Phone  Unavailable







Care Team Providers







 Care Team Member Name  Role  Phone

 

 TING CASTILLO  CP  Unavailable



                                                                



Allergies

          No Known Allergies                                                   
                                     



Problems

          





 Problem Type  Condition  Code  Onset Dates  Condition Status

 

 Problem  Pleurisy without mention of effusion or current tuberculosis  511.0  
   Active

 

 Problem  Asthma, unspecified, unspecified status  493.90     Active

 

 Problem  Routine general medical examination at health care facility  V70.0   
  Active

 

 Problem  Dysthymia  F34.1     Active

 

 Problem  Gastroesophageal reflux disease without esophagitis  K21.9     Active

 

 Problem  Mild persistent asthma without complication  J45.30     Active

 

 Problem  Encounter for dental examination  Z01.20     Active

 

 Problem  Contact dermatitis and other eczema, due to unspecified cause  692.9 
    Active

 

 Problem  Right foot pain  M79.671     Active

 

 Problem  Over weight  E66.3     Active

 

 Assessment  Dysthymia  F34.1     Active

 

 Problem  Other general medical examination for administrative purposes  V70.3 
    Active

 

 Problem  Need for prophylactic vaccination and inoculation, Influenza  V04.81 
    Active

 

 Problem  Other follow-up examination  V67.59     Active



                                                                               
                                                                               
                                                            



Medications

          





 Medication  Code System  Code  Instructions  Start Date  End Date  Status  
Dosage

 

 Risperidone  NDC  96530143307  1 MG Orally Once a day at bedtime           1 
Tablet

 

 Benzaclin  NDC  [address]-79  1-5 % Externally 2 times a day  March 10, 2015 
       1 yadira by Topical route 2 times per day



                                                                               
         



Results

          No Known Results                                                     
               



Summary Purpose

          eClinicalWorks Submission

## 2018-07-09 NOTE — XMS REPORT
[facility]

 Created on: 2016



Saúl Mccullough

External Reference #: [id number]

: 1990

Sex: Male



Demographics







 Address  [address] [place] [address]  [address]

 

 Home Phone  [phone]

 

 Preferred Language  Unknown

 

 Marital Status  Unknown

 

 Yazidi Affiliation  Unknown

 

 Race  Unreported/Refused to Report

 

 Ethnic Group  Not  or 





Author







 Author  MACHO KENT

 

 [facility]  eClinicalWorks

 

 Address  Unknown

 

 Phone  Unavailable







Care Team Providers







 Care Team Member Name  Role  Phone

 

 MACHO KENT    Unavailable



                                                                



Allergies

          No Known Allergies                                                   
                                     



Problems

          





 Problem Type  Condition  Code  Onset Dates  Condition Status

 

 Assessment  Weight increase  R63.5     Active

 

 Problem  Other general medical examination for administrative purposes  V70.3 
    Active

 

 Assessment  Non morbid obesity due to excess calories  E66.09     Active

 

 Problem  Contact dermatitis and other eczema, due to unspecified cause  692.9 
    Active

 

 Problem  Asthma, unspecified, unspecified status  493.90     Active

 

 Problem  Encounter for dental examination  Z01.20     Active

 

 Problem  Other follow-up examination  V67.59     Active

 

 Problem  Need for prophylactic vaccination and inoculation, Influenza  V04.81 
    Active

 

 Problem  Routine general medical examination at health care facility  V70.0   
  Active

 

 Problem  Pleurisy without mention of effusion or current tuberculosis  511.0  
   Active



                                                                               
                                                                               
                    



Medications

          No Known Medications                                                 
                             



Results

          No Known Results                                                     
               



Summary Purpose

          eClinicalWorks Submission

## 2018-07-09 NOTE — PROGRESS NOTE-PRE OPERATIVE
Pre-Operative Progress Note


H&P Reviewed


The H&P was reviewed, patient examined and no changes noted.


Time Seen by Provider:  08:09


Date H&P Reviewed:  Jul 9, 2018


Time H&P Reviewed:  08:11


Pre-Operative Diagnosis:  Incarcerated right inguinal hernia











NAZARIO HYATT DO Jul 9, 2018 08:15

## 2018-07-09 NOTE — XMS REPORT
[facility]

 Created on: 2016



Saúl Mccullough

External Reference #: [id number]

: 1990

Sex: Male



Demographics







 Address  [address] [place] [address]  [address]

 

 Home Phone  [phone]

 

 Preferred Language  Unknown

 

 Marital Status  Unknown

 

 Yazdanism Affiliation  Unknown

 

 Race  Unreported/Refused to Report

 

 Ethnic Group  Not  or 





Author







 Author  LIGIA GONZALEZ

 

 [facility]  eClinicalWorks

 

 Address  Unknown

 

 Phone  Unavailable







Care Team Providers







 Care Team Member Name  Role  Phone

 

 LIGIA GONZALEZ  CP  Unavailable



                                                                



Allergies

          No Known Allergies                                                   
                                     



Problems

          





 Problem Type  Condition  Code  Onset Dates  Condition Status

 

 Problem  Other general medical examination for administrative purposes  V70.3 
    Active

 

 Problem  Contact dermatitis and other eczema, due to unspecified cause  692.9 
    Active

 

 Problem  Asthma, unspecified, unspecified status  493.90     Active

 

 Problem  Encounter for dental examination  Z01.20     Active

 

 Problem  Other follow-up examination  V67.59     Active

 

 Problem  Need for prophylactic vaccination and inoculation, Influenza  V04.81 
    Active

 

 Problem  Routine general medical examination at health care facility  V70.0   
  Active

 

 Problem  Pleurisy without mention of effusion or current tuberculosis  511.0  
   Active



                                                                               
                                                                               



Medications

          





 Medication  Code System  Code  Instructions  Start Date  End Date  Status  
Dosage

 

 Loratadine  NDC  01302499100  10 MG Orally Once a day           1 tablet



                                                                              



Results

          No Known Results                                                     
               



Summary Purpose

          eClinicalWorks Submission

## 2018-07-09 NOTE — XMS REPORT
[facility]

 Created on: 2016



Sadia Saúl

External Reference #: [id number]

: 1990

Sex: Male



Demographics







 Address  [address] [place] [address]  [address]

 

 Home Phone  [phone]

 

 Preferred Language  Unknown

 

 Marital Status  Unknown

 

 Mandaen Affiliation  Unknown

 

 Race  Unreported/Refused to Report

 

 Ethnic Group  Not  or 





Author







 TESHA Mcconnell

 

 Organization  [employer]

 

 Address  Unknown

 

 Phone  Unavailable







Care Team Providers







 Care Team Member Name  Role  Phone

 

 TESHA RAMIREZ  CP  Unavailable



                                                                



Allergies, Adverse Reactions, Alerts

          





 Substance  Reaction  Event Type

 

 Penicillin V Potassium  anaphylaxis  Drug Allergy



                                                                               
         



Problems

          





 Problem Type  Condition  Code  Onset Dates  Condition Status

 

 Problem  Other general medical examination for administrative purposes  V70.3 
    Active

 

 Assessment  Encounter for dental examination and cleaning with abnormal 
findings  Z01.21     Active

 

 Problem  Contact dermatitis and other eczema, due to unspecified cause  692.9 
    Active

 

 Problem  Asthma, unspecified, unspecified status  493.90     Active

 

 Problem  Encounter for dental examination  Z01.20     Active

 

 Problem  Other follow-up examination  V67.59     Active

 

 Problem  Need for prophylactic vaccination and inoculation, Influenza  V04.81 
    Active

 

 Problem  Routine general medical examination at health care facility  V70.0   
  Active

 

 Problem  Pleurisy without mention of effusion or current tuberculosis  511.0  
   Active



                                                                               
                                                                               
          



Medications

          





 Medication  Code System  Code  Instructions  Start Date  End Date  Status  
Dosage

 

 Sucralfate  NDC  [address]-46  1 GM/10ML Orally Twice a day           10 ml



                                                                               
         



Procedures

          





 Procedure  Coding System  Code  Date

 

 TOPICAL FLUORIDE VARNISH  CPT-4    2016

 

 PROPHYLAXIS - ADULT  CPT-4    2016



                                                                               
         



Results

          No Known Results                                                     
               



Summary Purpose

          eClinicalWorks Submission

## 2018-07-09 NOTE — XMS REPORT
[facility]

 Created on: 2016



Sadia Saúl

External Reference #: [id number]

: 1990

Sex: Male



Demographics







 Address  [address] [place] [address]  [address]

 

 Home Phone  [phone]

 

 Preferred Language  Unknown

 

 Marital Status  Unknown

 

 Caodaism Affiliation  Unknown

 

 Race  Unreported/Refused to Report

 

 Ethnic Group  Not  or 





Author







 Author  VERONICA SOLER

 

 [facility]  eClinicalWorks

 

 Address  Unknown

 

 Phone  Unavailable







Care Team Providers







 Care Team Member Name  Role  Phone

 

 VERONICA SOLER  CP  Unavailable



                                                                



Allergies

          No Known Allergies                                                   
                                     



Problems

          





 Problem Type  Condition  Code  Onset Dates  Condition Status

 

 Problem  Other general medical examination for administrative purposes  V70.3 
    Active

 

 Assessment  Dental examination  Z01.20     Active

 

 Problem  Contact dermatitis and other eczema, due to unspecified cause  692.9 
    Active

 

 Problem  Asthma, unspecified, unspecified status  493.90     Active

 

 Problem  Encounter for dental examination  Z01.20     Active

 

 Problem  Other follow-up examination  V67.59     Active

 

 Problem  Need for prophylactic vaccination and inoculation, Influenza  V04.81 
    Active

 

 Problem  Routine general medical examination at health care facility  V70.0   
  Active

 

 Problem  Pleurisy without mention of effusion or current tuberculosis  511.0  
   Active



                                                                               
                                                                               
          



Medications

          No Known Medications                                                 
                                       



Procedures

          





 Procedure  Coding System  Code  Date

 

 Dental Outreach adjust balance  CPT-4  DENOR  2016

 

 AMALGAM-ONE SURFACE PRIMARY/PERM  CPT-4    2016



                                                                               
         



Results

          No Known Results                                                     
               



Summary Purpose

          eClinicalWorks Submission

## 2018-07-09 NOTE — XMS REPORT
[facility]

 Created on: 08/15/2016



Sadia, Seth

External Reference #: [id number]

: 1990

Sex: Male



Demographics







 Address  [address] [place] [address]  [address]

 

 Home Phone  [phone]

 

 Preferred Language  Unknown

 

 Marital Status  Unknown

 

 Gnosticism Affiliation  Unknown

 

 Race  Unreported/Refused to Report

 

 Ethnic Group  Not  or 





Author







 TESHA Mcconnell

 

 [facility]  eClinicalWorks

 

 Address  Unknown

 

 Phone  Unavailable







Care Team Providers







 Care Team Member Name  Role  Phone

 

 TESHA RAMIREZ  CP  Unavailable



                                                                



Allergies, Adverse Reactions, Alerts

          





 Substance  Reaction  Event Type

 

 Penicillin V Potassium  anaphylaxis  Drug Allergy



                                                                               
         



Problems

          





 Problem Type  Condition  Code  Onset Dates  Condition Status

 

 Problem  Pleurisy without mention of effusion or current tuberculosis  511.0  
   Active

 

 Problem  Asthma, unspecified, unspecified status  493.90     Active

 

 Problem  Routine general medical examination at health care facility  V70.0   
  Active

 

 Problem  Dysthymia  F34.1     Active

 

 Problem  Gastroesophageal reflux disease without esophagitis  K21.9     Active

 

 Problem  Mild persistent asthma without complication  J45.30     Active

 

 Problem  Encounter for dental examination  Z01.20     Active

 

 Problem  Contact dermatitis and other eczema, due to unspecified cause  692.9 
    Active

 

 Problem  Right foot pain  M79.671     Active

 

 Problem  Over weight  E66.3     Active

 

 Assessment  Encounter for dental examination and cleaning without abnormal 
findings  Z01.20     Active

 

 Problem  Other general medical examination for administrative purposes  V70.3 
    Active

 

 Problem  Need for prophylactic vaccination and inoculation, Influenza  V04.81 
    Active

 

 Problem  Other follow-up examination  V67.59     Active



                                                                               
                                                                               
                                                            



Medications

          





 Medication  Code System  Code  Instructions  Start Date  End Date  Status  
Dosage

 

 Risperidone  NDC  10951222284  1 MG Orally Once a day at bedtime, NEEDS APPT 
BEFORE ANY FURTHER REFILLS           1 Tablet

 

 Sucralfate  NDC  80526621095  1 GM Orally 2 times a day, NEEDS APPT BEFORE ANY 
FURTHER REFILLS           1 tablet

 

 Advair Diskus  NDC  57381759510  250-50 MCG/DOSE Inhalation Twice a day, NEEDS 
APPT BEFORE ANY FURTHER REFILLS           1 puff

 

 Claritin  NDC  [address]-15  10 MG Orally Once a day           1 tablet

 

 Wellbutrin XL  NDC  [address]-29  300 MG Orally Once a day  Oct 15, 2014     
   1 tablet in the morning

 

 Sucralfate  NDC  [address]-22  1 GM/10ML Orally Twice a day           10 ml

 

 Meloxicam  NDC  46884534663  15 MG Orally Once a day, NEEDS APPT BEFORE ANY 
FURTHER REFILLS           take 1 tablet



                                                                               
                                                                     



Procedures

          





 Procedure  Coding System  Code  Date

 

 TOPICAL FLUORIDE VARNISH  CPT-4    2016

 

 PROPHYLAXIS - ADULT  CPT-4    2016



                                                                               
         



Results

          No Known Results                                                     
               



Summary Purpose

          eClinicalWorks Submission

## 2018-07-09 NOTE — DISCHARGE INST-SURGICAL
Discharge Inst-Surgical


Depart Medication/Instructions


New, Converted or Re-Newed RX:  RX Given to Pt/Family


Patient Instructions


Follow up Appt:


Make appointment for 1 week.





Instructions:


No lifting greater than 10 pounds.


No strenuous activity. 


May shower in 24 hours, no tub bath or soaking.


Use incentive spirometer at home as directed.


No Smoking





Skin/Wound Care:


May remove bandages.  You need to leave the white strips over incision on they 

will fall off on their own. 





Symptoms to Report:


Appetite Changes, Extremity Discoloration, Numbness/Tingling, Swelling Increased

, Bleeding Excessive, Eyesight Changes, Pain Increased, Urine Color Change, 

Constipation(Persistent), Fever over 101 degree F, Pain/Pressure in chest, 

Urinating Difficulty, Cough Up/Vomit Blood, Heart Beat Irreg/Pounding, Pain/

Pressure in jaw, Cramps in feet or legs, Lightheadedness, Pain/Pressure in 

shoulder, Diarrhea(Persistent), Memory Changes Suddenly, Questions/Concerns, 

Weight gain consecutive days, Dizziness/Fainting, Nausea/Vomiting, Shortness of 

Breath, Weight gain over 2 pounds








If questions or concerns contact your physician 


Or seek help at emergency department.





Activity


Activity as Tolerated:  Yes


Activity Instructions:  Avoid Pulling & Pushing, Avoid Stress to Incision


Driving Instructions:  No Driving/Refer to Dr. Wood


Discharge Diet:  No Restrictions


Diet After 24 Hours:  Clear Liquid if Nauseous


If Any Problems/Questions/Issu:  Contact Your Physician, Go to Emergency Room





Skin/Wound Care


Infection Signs and Symptoms:  Increased Redness, Foul Odor of Wound, Increased 

Drainage, Skin Itchy or Has a Rash, Increased Swelling, Temperature Above 101  F


Wound Care Comment:  


Expect  some swelling and bruising into scrotum and even penis


Stitches/Staples/Dermabond Dis:  Dermabond


Ice Pack:  Ice On and Off Site (as needed for pain)











NAZARIO HYATT DO Jul 9, 2018 13:39

## 2018-07-09 NOTE — PROGRESS NOTE-POST OPERATIVE
Post-Operative Progess Note


Surgeon (s)/Assistant (s)


Surgeon


NAZARIO HYATT DO


Assistant:  Blank





Pre-Operative Diagnosis


Incarcerated right inguinal hernia





Post-Operative Diagnosis





Same large indirect with omentum stuck inside





Procedure & Operative Findings


Date of Procedure


7/9/18


Procedure Performed/Findings


Laparoscopic Right Inguinal Hernia with the robot


Anesthesia Type


GET





Estimated Blood Loss


Estimated blood loss (mL):  scant





Specimens/Packing


Specimens Removed


none











NAZARIO HYATT DO Jul 9, 2018 13:37

## 2018-07-09 NOTE — XMS REPORT
[facility]

 Created on: 2018



Saúl Mccullough

External Reference #: [id number]

: 1990

Sex: Male



Demographics







 Address  [address] [place]  [address]

 

 Preferred Language  Unknown

 

 Marital Status  Unknown

 

 [employer] Affiliation  Unknown

 

 Race  Unknown

 

 Ethnic Group  Unknown





Author







 Author  TESHA RAMIREZ

 

 Organization  [facility]

 

 Address  [address] [place]  [address]



 

 Phone  Unavailable







Care Team Providers







 Care Team Member Name  Role  Phone

 

 TESHA RAMIREZ  Unavailable  Unavailable







PROBLEMS







 Type  Condition  ICD9-CM Code  YNG47-DG Code  Onset Dates  Condition Status  
SNOMED Code

 

 Problem  Dysthymia     F34.1     Active  58833543

 

 Problem  Right foot pain     M79.671     Active  14841277

 

 Problem  Over weight     E66.3     Active  700649207

 

 Problem  Gastroesophageal reflux disease without esophagitis     K21.9     
Active  820086521

 

 Problem  Mild persistent asthma without complication     J45.30     Active  
995699010







ALLERGIES







 Substance  Reaction  Event Type  Date  Status

 

 Penicillin V Potassium  anaphylaxis  Drug Allergy  15 Aug, 2017  Active







ENCOUNTERS







 Encounter  Location  Date  Diagnosis

 

 [facility]  [id number] [place] [address] 
[id number]  10 Apr, 2018  Encounter for dental exam and cleaning w/o abnormal 
findings Z01.20

 

 [place] [address] [id number] [place] [address] [id number]  09 2018  Acute nasopharyngitis J00

 

 [facility]  [id number] [place] [address] [id number]  13 2018  Mild persistent asthma without complication J45.30 ; Dysthymia F34.1 and 
Gastroesophageal reflux disease without esophagitis K21.9

 

 [id number] [place] [address] [id number]  08 2018  Right foot pain M79.671 ; Encounter for screening for lipoid disorders 
Z13.220 and Encounter for screening for cardiovascular disorders Z13.6

 

 [address] [place] [address] [id number]  15 2018   

 

 [facility]  [id number] [place] [address] [id number]  15 2018   

 

 [address] [id number][place] [phone]  
05 Dec, 2017  Dental examination Z01.20

 

 [facility]  [address] [id number] 
[id number]  05 Dec, 2017  Dental examination Z01.20

 

 [facility]  [address] [id number][place] [phone]    Encounter for immunization Z23

 

 [facility]  [address] [id number] [address]  16 Aug, 2017   

 

 [facility]  [address] [id number] [place], [place] 
[id number]  15 Aug, 2017  Encounter for dental examination and cleaning without 
abnormal findings Z01.20

 

 [facility]  [address] [id number] [place] [address]46  10 Aug, 2017   

 

 [facility]  [address] [id number], [place] [id number]  10 Aug, 
2017  Right foot pain M79.671 ; Dysthymia F34.1 ; Mild persistent asthma 
without complication J45.30 ; Gastroesophageal reflux disease without 
esophagitis K21.9 ; Encounter for screening for lipoid disorders Z13.220 ; 
Encounter for screening for cardiovascular disorders Z13.6 and Acne vulgaris 
L70.0

 

 [address] [id number][place] [phone]  
  Dental examination Z01.20

 

 [address] [id number], [place] [id number]  
24 May, 2017  Dental examination Z01.20

 

 [facility]  [address] [id number] [place] 
[id number]  24 May, 2017  Encounter for dental examination and cleaning without 
abnormal findings Z01.20

 

 [facility]  [address] [id number], [place] [phone]    Dysthymia F34.1 ; Mild persistent asthma without complication J45.30 and 
Gastroesophageal reflux disease without esophagitis K21.9

 

 [facility]  [address] [id number] [place], [place] 
[address]    Encounter for dental examination and cleaning without 
abnormal findings Z01.20

 

 [facility]  [address] [id number], [place] [address]    Physical exam, routine Z00.00

 

 [facility]  [address] [id number], [place] [phone]    Dysthymia F34.1

 

 [facility]  [address] [id number][place] [id number]  
20 Dec, 2016  Dental examination Z01.20

 

 [facility]  [address] [id number], [place] [phone]  01 Dec, 
2016   

 

 [facility]  [address] [id number], [place] [id number]  
27 Oct, 2016  Encounter for dental examination Z01.20

 

 [facility]  [address] [id number] [place], [place] 
[id number]  27 Oct, 2016  Encounter for dental examination and cleaning without 
abnormal findings Z01.20

 

 [facility]  [address] [id number], [place] [id number]  20 Oct, 
2016  Encounter for immunization Z23

 

 [facility]  [address] [id number] [place] [address] [id number]  19 Oct, 
2016   

 

 [facility]  [address] [id number] [place] [address] [id number]  18 Oct, 
2016  Right foot pain M79.671

 

 [facility]  [address] [id number] [place] [address] [id number]  15 Aug, 
2016   

 

 [facility]  [address] [id number], [place] [id number]  10 Aug, 
2016  Dysthymia F34.1

 

 [facility]  [address] [id number], [place] [id number]  08 Aug, 
2016  Mild persistent asthma without complication J45.30 ; Dysthymia F34.1 ; 
Gastroesophageal reflux disease without esophagitis K21.9 ; Right foot pain 
M79.671 and Over weight E66.3

 

 [facility]  [address] [id number] [place] [address] [id number]  05 Aug, 
2016   

 

 [facility]  [address] [id number], [place] [id number]  04 Aug, 
2016   

 

 [facility]  [address] [id number] [place] [address] [id number]     

 

 [facility]  [address]00565100[place], [place] 
[address]    Encounter for dental examination and cleaning without 
abnormal findings Z01.20

 

 [facility]  [id number], [place] [phone]     

 

 [facility]  [address] [id number], [place] [phone]  
  Dental examination Z01.20

 

 [facility]  [id number] [place] [address] [id number]  
  Dental examination Z01.20

 

 [facility]  [address] [place] [address] 
[id number]    Encounter for dental examination and cleaning with 
abnormal findings Z01.21

 

 [facility]  [id number] [place] [address] [id number]  09 Mar, 
2016   

 

 [facility]  [address] [id number] [place] [address] [id number]     

 

 [facility]  [id number] [place] [address] [id number]    Elevated fasting glucose R73.01

 

 [facility]  [id number] [place] [address] [id number]  10 2016  Elevated fasting glucose R73.01

 

 [place] [id number] [place] [address] [id number]  08 2016  Non morbid obesity due to excess calories E66.09 and Weight increase R63.5

 

 [facility]  [id number] [place] [address] [id number]  05 2016  High risk medication use Z79.899 ; Wellness examination Z00.00 and 
Encounter for immunization Z23

 

 [facility]  [id number] [place] [address] [id number]     

 

 [facility]  [id number] [place] [address] [id number]  21 Dec, 
2015   

 

 [facility]  [id number] [place] [address]-
[address]     

 

 [facility]  [address] [id number], [place] [id number]     

 

 [facility]  [address] [id number], [place] [id number]  29 Oct, 
2015   

 

 [facility]  [address] [id number], [place] [id number]  
29 Oct, 2015  Dental examination Z01.20

 

 [facility]  [address] [id number], [place] [id number]  
28 Sep, 2015  Dental examination V72.2

 

 [facility]  [address] [id number], [place] [id number]  28 Aug, 
2015   

 

 [facility]  [address] [id number], [place] [id number]  27 Aug, 
2015  Tinea corporis 110.5

 

 [facility]  [address] [id number], [place] [phone]  04 Aug, 
2015   

 

 [facility]  [address] [id number], [place] [address]     

 

 [facility]  [address] [id number], [place] [phone]     

 

 [facility]  [address] [id number], [place] [id number]  27 May, 
2015   

 

 [facility]  [address] [id number], [place] [phone]  05 May, 
2015   

 

 [facility]  [address] [id number], [place] [phone]  05 May, 
2015   

 

 [facility] [place] [facility]  [address] [facility] [id number] [place] [address]-
[address]     

 

 [facility] [place] [facility]  [address] [facility] [id number] [place] [address]-
[address]     

 

 [facility]  [address] [id number], [place] [phone]  10 Mar, 
2015   

 

 [facility] [place] [facility]  [address] [facility] [id number] [place] [address]-
2416  10 Mar, 2015   

 

 [facility]  [address] [id number], [place] [id number]     

 

 [facility] [place] [facility]  [address] [facility] [id number] [place] [address]-
2496     

 

 [facility]  [address] [id number], [place] [phone]     

 

 [facility] [place] [facility]  [address] [facility] [id number] [place] [address]-
1488     

 

 [facility]  [address] [facility] [id number], [place] [id number]  22 Dec, 
2014   

 

 [facility] [place] [facility]  [address] [facility] [id number] [place] [address]-
4728  22 Dec, 2014   

 

 [facility]  [address] [facility] [id number], [place] [phone]     

 

 [facility] [place] [facility]  [address] [facility] [id number] [place] [address]-
8466     

 

 [facility] [place] [facility]  [address]00565100[place] [address]-
9794  22 Oct, 2014   

 

 [facility]  [address] [facility] [id number], [place] [id number]  22 Oct, 
2014   

 

 [facility] [place] [facility]  [address] [facility] [id number] [place] [address]-
9096  15 Oct, 2014   

 

 [facility]  [address] [facility] [id number], [place] [id number]  15 Oct, 
2014   

 

 [facility]  [address] [facility] [id number], [place] [id number]     

 

 [facility] [place] [facility]  [address] [facility] [id number] [place] [address]-
2546     

 

 [facility]  [address] [id number], [place] [id number]     

 

 [facility] [place] [facility]  [address] [facility] [id number] [place] [address]-
1106     

 

 [facility]  [address] [facility] [id number], [place] [phone]  27 May, 
2014   

 

 [facility] [place] [facility]  [address] [facility] [id number] [address]-
9516  27 May, 2014   

 

 [facility]  [address] [id number] [place] [phone]  07 May, 
2014   

 

 [facility]  [address] [facility] [id number] [place] [address]-
2546  07 May, 2014   

 

 [facility]  [address] [id number] [place] [id number]     

 

 [facility]  [address] [facility] [id number] [place] [address]-
4856     

 

 [facility]  [address] [facility] [id number] [place] [phone]     

 

 [facility]  [address] [facility] [id number] [address]-
5336     

 

 [facility]  [address] [id number] [place] [phone]     

 

 [facility]  [address]00565100[place] [address]-
0761     

 

 [facility]  [address] [facility] [id number], [place] [id number]  13 Dec, 
2013   

 

 [facility] [place] [facility]  [address]00565100[place] [address]-
7016  13 Dec, 2013   

 

 [facility]  [address] [facility] [id number], [place] [phone]  02 Dec, 
2013   

 

 [facility] [place] [facility]  [address] [id number] [address]-
6496  02 Dec, 2013   

 

 [facility]  [address] [facility] [id number], [place] [id number]  30 Oct, 
2013   

 

 [facility] [place] [facility]  [address] [facility] [id number] [place] [address]-
6109  30 Oct, 2013   

 

 [facility]  [address] [facility] [id number], [place] [address]153742  28 Oct, 
2013   

 

 [facility] [place] [facility]  [address] [id number] [address]-
4026  28 Oct, 2013   

 

 [facility] [place] [facility]  [address] [facility] [id number] [place] [address]-
9840  18 Oct, 2013   

 

 [facility]  [address] [id number] [place] [id number]  18 Oct, 
2013   

 

 [facility] [place] [facility]  [address] [facility] [id number] [place] [address]-
9475  15 Oct, 2013   

 

 [facility]  [address] [id number] [place] [phone]  15 Oct, 
2013   

 

 [facility]  [address] [id number] [place] [id number]  05 Sep, 
2013   

 

 [facility]  [address] [id number] [place] [phone]  16 Aug, 
2013   

 

 [facility]  [address] [id number] [place] [phone]  12 Aug, 
2013   

 

 [facility] [place] [facility]  [address] [facility] [id number] [place] [address]-
0544     

 

 [facility] [place] [facility]  [address]00565100[place] [address]-
9302  24 Dec, 2010   

 

 [facility] [place] [facility]  [address] [facility] [id number] [place] [address]-
6550  15 Nov, 2010   

 

 [facility] [place] [facility]  [address] [id number] [address]-
0237  27 Oct, 2010   

 

 [facility] [place] [facility]  [address] [id number] [address]-
9186  19 Oct, 2010   

 

 [facility] [place] [facility]  [address] [id number] [address]-
9575  19 Oct, 2010   

 

 [facility] [place] [facility]  [address] [facility] [id number] [place] [address]-
0401  11 Oct, 2010   

 

 [facility] [place] [facility]  [address] [facility] [id number] [place], [place] 755371-
7043  15 Dec, 2009   

 

 [facility] [place] [facility]  [address] [facility] [id number] [place] 341431-
2235  15 Dec, 2009   

 

 [facility] [place] [facility]  [address] [facility] [id number] [place] 279994-
9558  14 Dec, 2009   

 

 [facility]  [address] [facility] [id number] [place] [address]-
0845  07 Dec, 2009   







IMMUNIZATIONS

No Known Immunizations



SOCIAL HISTORY

Never Assessed



REASON FOR VISIT

ADULT OUTREACH CLASS [facility]



PLAN OF CARE





VITAL SIGNS





MEDICATIONS







 Medication  Instructions  Dosage  Frequency  Start Date  End Date  Duration  
Status

 

 Wellbutrin  mg  Orally Once a day in the morning  1 tablet           30  
Active

 

 Risperidone 1 MG  Orally Once a day at bedtime  1 Tablet              Active

 

 Meloxicam 15 MG  Orally Once a day, NEEDS APPT BEFORE ANY FURTHER REFILLS  
take 1 tablet           90  Active

 

 Advair Diskus 250-50 mcg/dose     INHALE ONE (1) PUFF BY MOUTH TWICE DAILY (
APPROX. EVERY 12 HRS)              Active

 

 Acetaminophen 500 mg  Orally 3 times a day  2 capsules as needed  8h  10 Aug, 
2017        Active

 

 Loratadine 10 mg  Orally Once a day  1 tablet  24h        30  Active

 

 Epiduo 0.1-2.5 %  Externally 2 times a day  as directed  12h  15 Aug, 2017  14 
Sep, 2017  30 days  Active

 

 ProAir  (90 Base) mcg/act     inhale 2 puffs by Inhalation route every 
6 hours as needed  PRN shortness of breath/cough     15 Oct, 2014        Active

 

 Omeprazole 20 mg  Orally Once a day, NEEDS APPT BEFORE ANY FURTHER REFILLS  1 
tablet              Active







RESULTS

No Results



PROCEDURES







 Procedure  Date Ordered  Result  Body Site

 

 PROPHYLAXIS - ADULT  Aug 15, 2017      

 

 TOPICAL FLUORIDE VARNISH  Aug 15, 2017      







INSTRUCTIONS





MEDICATIONS ADMINISTERED

No Known Medications



MEDICAL (GENERAL) HISTORY







 Type  Description  Date

 

 Medical History  seasonal allergies   

 

 Medical History  acid reflux   

 

 Medical History  anxiety   

 

 Medical History  depression   

 

 Medical History  attention deficit hyperactivity disorder   

 

 Medical History  mild mental retardation   

 

 Medical History  Asthma   

 

 Surgical History  orthopedic surgery-right foot, corrective surgery

## 2018-07-09 NOTE — XMS REPORT
[facility]

 Created on: 2018



Saúl Mccullough

External Reference #: [id number]

: 1990

Sex: Male



Demographics







 Address  [address] [place]  [address]

 

 Preferred Language  Unknown

 

 Marital Status  Unknown

 

 Gnosticist Affiliation  Unknown

 

 Race  Unknown

 

 Ethnic Group  Unknown





Author







 Author  TING CASTILLO

 

 [facility]

 

 Address  [address] [place]  [address]



 

 Phone  [phone]







Care Team Providers







 Care Team Member Name  Role  Phone

 

 TING CASTILLO  Unavailable  [phone]







PROBLEMS







 Type  Condition  ICD9-CM Code  JAO66-WF Code  Onset Dates  Condition Status  
SNOMED Code

 

 Problem  Dysthymia     F34.1     Active  38756943

 

 Problem  Right foot pain     M79.671     Active  77032087

 

 Problem  Over weight     E66.3     Active  690925931

 

 Problem  Gastroesophageal reflux disease without esophagitis     K21.9     
Active  137907892

 

 Problem  Mild persistent asthma without complication     J45.30     Active  
[id number]







ALLERGIES

No Information



ENCOUNTERS







 Encounter  Location  Date  Diagnosis

 

 [facility]  [address] [id number] [place] [address] 
[id number]  10 2018  Encounter for dental exam and cleaning w/o abnormal 
findings Z01.20

 

 [place] [id number] [place] [address] [id number]  09 2018  Acute nasopharyngitis J00

 

 [place] [address] [id number]  13 2018  Mild persistent asthma without complication J45.30 ; Dysthymia F34.1 and 
Gastroesophageal reflux disease without esophagitis K21.9

 

 [place] [id number] [place] [address] [id number]  08 2018  Right foot pain M79.671 ; Encounter for screening for lipoid disorders 
Z13.220 and Encounter for screening for cardiovascular disorders Z13.6

 

 [place] [id number] [place] [address] [id number]  15 2018   

 

 [facility]  [address] [id number]  15 2018   

 

 [place] [address]  [id number] [place] [address] [id number]  
05 Dec, 2017  Dental examination Z01.20

 

 [facility]  [address] [id number] [place] 
[phone]  05 Dec, 2017  Dental examination Z01.20

 

 [facility]  [address] [id number][place] [phone]    Encounter for immunization Z23

 

 [facility]  [address] [id number] [place] [address]  16 Aug, 2017   

 

 [facility]  [address] [id number] [place] [address] 
[id number]  15 Aug, 2017  Encounter for dental examination and cleaning without 
abnormal findings Z01.20

 

 [facility]  [address] [id number] [place] [phone]
[phone]  10 Aug, 2017   

 

 [facility]  [address] [id number][place] [phone]  10 Aug, 
2017  Right foot pain M79.671 ; Dysthymia F34.1 ; Mild persistent asthma 
without complication J45.30 ; Gastroesophageal reflux disease without 
esophagitis K21.9 ; Encounter for screening for lipoid disorders Z13.220 ; 
Encounter for screening for cardiovascular disorders Z13.6 and Acne vulgaris 
L70.0

 

 [address] [id number], [place] [id number]  
  Dental examination Z01.20

 

 [address] [id number], [place] [id number]  
24 May, 2017  Dental examination Z01.20

 

 [facility]  [address] [id number] [place], [place] 
[id number]  24 May, 2017  Encounter for dental examination and cleaning without 
abnormal findings Z01.20

 

 [facility]  [address] [id number][place] [id number]    Dysthymia F34.1 ; Mild persistent asthma without complication J45.30 and 
Gastroesophageal reflux disease without esophagitis K21.9

 

 [facility]  [address] [id number] [place], [place] 
[id number]    Encounter for dental examination and cleaning without 
abnormal findings Z01.20

 

 [facility]  [address] [id number][place] [address]    Physical exam, routine Z00.00

 

 [facility] [place]  [address] [id number], [place] [phone]    Dysthymia F34.1

 

 [facility]  [address] [id number], [place] [phone]  
20 Dec, 2016  Dental examination Z01.20

 

 [facility] [place]  [id number] [place] [address] [id number]  01 Dec, 
2016   

 

 [facility]  [id number] 
[id number]  27 Oct, 2016  Encounter for dental examination and cleaning without 
abnormal findings Z01.20

 

 [facility]  [address] [id number], [place] [phone]  
27 Oct, 2016  Encounter for dental examination Z01.20

 

 [facility] [place]  [id number] [place] [address] [id number]  20 Oct, 
2016  Encounter for immunization Z23

 

 [facility]  [address] [place] [id number] [place] [address] [id number]  19 Oct, 
2016   

 

 [facility] [place]  [address] [place] [id number] [place] [address] [id number]  18 Oct, 
2016  Right foot pain M79.671

 

 [facility]  [id number] [place] [address] [id number]  15 Aug, 
2016   

 

 [facility]  [address] [place] [id number] [place] [address] [id number]  10 Aug, 
2016  Dysthymia F34.1

 

 [facility]  [id number] [place] [address] [id number]  08 Aug, 
2016  Mild persistent asthma without complication J45.30 ; Dysthymia F34.1 ; 
Gastroesophageal reflux disease without esophagitis K21.9 ; Right foot pain 
M79.671 and Over weight E66.3

 

 [facility]  [id number] [place] [address] [id number]  05 Aug, 
2016   

 

 [facility]  [address] [id number] [place] [address] [id number]  04 Aug, 
2016   

 

 [facility]  [address] [place] [id number] [place] [address] [id number]     

 

 [facility]  [address] [id number] Creek, [place] 
[id number]    Encounter for dental examination and cleaning without 
abnormal findings Z01.20

 

 [facility] [place]  [id number], [place] [phone]     

 

 [facility]  [address] [id number], [place] [phone]  
  Dental examination Z01.20

 

 [facility]  [address] [id number], [place] [phone]  
  Dental examination Z01.20

 

 [facility]  [address] [place] [id number] [place] [address] 
[id number]    Encounter for dental examination and cleaning with 
abnormal findings Z01.21

 

 [facility]  [id number] [place] [address] [id number]  09 Mar, 
2016   

 

 [facility]  [address] [id number] [place] [address] [id number]     

 

 [facility]  [id number] [place] [address] [id number]    Elevated fasting glucose R73.01

 

 [facility]  [address] [place] [id number] [place] [address] [id number]  10 2016  Elevated fasting glucose R73.01

 

 [place] [address] [address] [place] [id number] [place] [address] [id number]  08 2016  Non morbid obesity due to excess calories E66.09 and Weight increase R63.5

 

 [facility]  [address] [place] [id number] [place] [address] [id number]  05 2016  High risk medication use Z79.899 ; Wellness examination Z00.00 and 
Encounter for immunization Z23

 

 [facility]  [address] [place] [id number] [place] [address] [id number]     

 

 [facility]  [address] [place] [id number] [place] [address] [id number]  21 Dec, 
2015   

 

 [facility]  [address] [place] [id number] [place] [address]-
[address]     

 

 [facility]  [address] [id number], [place] [phone]     

 

 [facility]  [address] [id number], [place] [phone]  29 Oct, 
2015   

 

 [facility]  [address] [id number], [place] [phone]  
29 Oct, 2015  Dental examination Z01.20

 

 [facility]  [address] [id number], [place] [phone]  
28 Sep, 2015  Dental examination V72.2

 

 [facility]  [address] [id number], [place] [phone]  28 Aug, 
2015   

 

 [facility]  [address] [id number], [place] [phone]  27 Aug, 
2015  Tinea corporis 110.5

 

 [facility]  [address] [id number], [place] [phone]  04 Aug, 
2015   

 

 [facility]  [address] [id number], [place] [id number]     

 

 [facility]  [address] [id number], [place] [phone]     

 

 [facility]  [address] [id number], [place] [id number]  27 May, 
2015   

 

 [facility]  [address] [id number], [place] [phone]  05 May, 
2015   

 

 [facility]  [address] [id number], [place] [phone]  05 May, 
2015   

 

 [facility]  [address] [facility] [id number] [address]-
2546     

 

 [facility]  [address] [facility] [id number] [place] [address]-
[address]     

 

 [facility]  [address] [id number], [place] [phone]  10 Mar, 
2015   

 

 [facility]  [address] [facility] [id number] [place] [address]-
2104  10 Mar, 2015   

 

 [facility]  [address] [id number], [place] [phone]     

 

 [facility] [place] [facility]  [address] [facility] [id number] [place] [address]-
2546     

 

 [facility]  [address] [id number] [place] [phone]     

 

 [facility] [place] [facility]  [address] [facility] [id number] [place] [address]-
5826     

 

 [facility]  [address] [facility] [id number] [place] [phone]  22 Dec, 
2014   

 

 [facility] [place] [facility]  [address] [facility] [id number] [place] [address]-
9921  22 Dec, 2014   

 

 [facility]  [address] [facility] [id number] [place] [phone]     

 

 [facility] [place] [facility]  [address] [facility] [id number] [place] [address]-
1936     

 

 [facility] [place] [facility]  [address]00565100[facility] [place] [facility] [address]-
2816  22 Oct, 2014   

 

 [facility]  [address] [id number] [place] [phone]  22 Oct, 
2014   

 

 [facility] [place] [facility]  [address] [facility] [id number] [address]-
3256  15 Oct, 2014   

 

 [facility]  [address] [facility] [id number], [place] [address]  15 Oct, 
2014   

 

 [facility]  [address] [facility] [id number], [place] [phone]     

 

 [facility] [place] [facility]  [address] [facility] [id number] [place] [address]-
2546     

 

 [facility]  [address] [facility] [id number] [place] [phone]     

 

 [facility] [place] [facility]  [address] [facility] [id number] [place] [address]-
[address]6     

 

 [facility]  [address] [facility] [id number] [place] [phone]  27 May, 
2014   

 

 [facility] [place] [facility]  [address] [facility] [id number] [address]-
1846  27 May, 2014   

 

 [facility]  [address] [id number] [place] [phone]  07 May, 
2014   

 

 [facility]  [address] [facility] [id number] [address]-
5826  07 May, 2014   

 

 [facility]  [address] [id number] [place] [phone]     

 

 [facility] [place] [facility]  [address] [facility] [id number] [place] [address]-
3513     

 

 [facility]  [address] [id number] [place] [phone]     

 

 [facility] [place] [facility]  [address] [facility] [id number] [address]-
7361     

 

 [facility]  [address] [facility] [id number] [place] [phone]     

 

 [facility] [place] [facility]  [address]00565100[place] [address]-
5275     

 

 [facility]  [address] [facility] [id number] [place] [id number]  13 Dec, 
2013   

 

 [facility] [place] [facility]  [address]00565100[place] [address]-
1598  13 Dec, 2013   

 

 [facility]  [address] [facility] [id number] [place] [address]  02 Dec, 
2013   

 

 [facility] [place] [facility]  [address] [facility] [id number] [address]-
1368  02 Dec, 2013   

 

 [facility]  [address] [facility] [id number], [place] [phone]  30 Oct, 
2013   

 

 [facility] [place] [facility]  [address] [facility] [id number] [place] [address]-
4833  30 Oct, 2013   

 

 [facility]  [address] [facility] [id number], [place] [phone]  28 Oct, 
2013   

 

 [facility] [place] [facility]  [address] [facility] [id number] [place] [address]-
9357  28 Oct, 2013   

 

 [facility] [place] [facility]  [address] [facility] [id number] [place] [address]-
0906  18 Oct, 2013   

 

 [facility]  [address] [id number] [place] [phone]  18 Oct, 
2013   

 

 [facility] [place] [facility]  [address] [id number] [place] [address]-
6066  15 Oct, 2013   

 

 [facility]  [address] [id number] [place] [phone]  15 Oct, 
2013   

 

 [facility]  [address] [id number] [place] [address]  05 Sep, 
2013   

 

 [facility]  [address] [id number] [place] [address]  16 Aug, 
2013   

 

 [facility]  [address] [id number] [place] [address]  12 Aug, 
2013   

 

 [facility] [place] [facility]  [address] [facility] [id number] [address]-
5865     

 

 [facility] [place] [facility]  [address] [place] [id number] [place] [address]-
1442  24 Dec, 2010   

 

 [facility] [place] [facility]  [address] [facility] [id number] [place] [address]-
8209  15 Nov, 2010   

 

 [facility] [place] [facility]  [address] [place] [id number] [address]-
0108  27 Oct, 2010   

 

 [facility] [place] [facility]  [address] [place] [id number] 819937-
5432  19 Oct, 2010   

 

 [facility] [place] [facility]  [address]00565100[place] [address]-
9003  19 Oct, 2010   

 

 [facility] [place] [facility]  [address] [facility] [id number] [place] [address]-
9130  11 Oct, 2010   

 

 [facility] [place] [facility]  [address] [facility] [id number] [place] [address]-
8836  15 Dec, 2009   

 

 [facility] [place] [facility]  [address] [facility] [id number] [place] [address]-
0212  15 Dec, 2009   

 

 [facility] [place] [facility]  [address] [place] [id number] [address]-
1448  14 Dec, 2009   

 

 [facility]  [address] [facility] [id number] [place], [place] 781342-
2484  07 Dec, 2009   







IMMUNIZATIONS







 Vaccine  Route  Administration Date  Status

 

 FLUARIX QUAD (3 AND UP)   IM Intramuscular  2017  Administered







SOCIAL HISTORY

Never Assessed



REASON FOR VISIT

Flu Shot Panchito LEGGETT



PLAN OF CARE





VITAL SIGNS





MEDICATIONS

No Known Medications



RESULTS

No Results



PROCEDURES







 Procedure  Date Ordered  Result  Body Site

 

 FLUARIX QUAD (3 AND UP) 2017  2017      

 

 SINGLE IMMUNIZATION ADMIN  2017      







INSTRUCTIONS





MEDICATIONS ADMINISTERED

No Known Medications



MEDICAL (GENERAL) HISTORY







 Type  Description  Date

 

 Medical History  seasonal allergies   

 

 Medical History  acid reflux   

 

 Medical History  anxiety   

 

 Medical History  depression   

 

 Medical History  attention deficit hyperactivity disorder   

 

 Medical History  mild mental retardation   

 

 Medical History  Asthma   

 

 Surgical History  orthopedic surgery-right foot, corrective surgery

## 2018-07-09 NOTE — XMS REPORT
[facility]

 Created on: 2016



Saúl Mccullough

External Reference #: [id number]

: 1990

Sex: Male



Demographics







 Address  [address] [place] [address]  [address]

 

 Home Phone  [phone]

 

 Preferred Language  Unknown

 

 Marital Status  Unknown

 

 Hinduism Affiliation  Unknown

 

 Race  Unreported/Refused to Report

 

 Ethnic Group  Not  or 





Author







 Author  VERONICA SOLER

 

 [facility]  eClinicalWorks

 

 Address  Unknown

 

 Phone  Unavailable







Care Team Providers







 Care Team Member Name  Role  Phone

 

 VERONICA SOLER    Unavailable



                                                                



Allergies

          No Known Allergies                                                   
                                     



Problems

          





 Problem Type  Condition  Code  Onset Dates  Condition Status

 

 Problem  Pleurisy without mention of effusion or current tuberculosis  511.0  
   Active

 

 Problem  Asthma, unspecified, unspecified status  493.90     Active

 

 Problem  Routine general medical examination at health care facility  V70.0   
  Active

 

 Problem  Dysthymia  F34.1     Active

 

 Problem  Gastroesophageal reflux disease without esophagitis  K21.9     Active

 

 Problem  Mild persistent asthma without complication  J45.30     Active

 

 Problem  Encounter for dental examination  Z01.20     Active

 

 Problem  Contact dermatitis and other eczema, due to unspecified cause  692.9 
    Active

 

 Problem  Right foot pain  M79.671     Active

 

 Problem  Over weight  E66.3     Active

 

 Assessment  Encounter for dental examination  Z01.20     Active

 

 Problem  Other general medical examination for administrative purposes  V70.3 
    Active

 

 Problem  Need for prophylactic vaccination and inoculation, Influenza  V04.81 
    Active

 

 Problem  Other follow-up examination  V67.59     Active



                                                                               
                                                                               
                                                            



Medications

          





 Medication  Code System  Code  Instructions  Start Date  End Date  Status  
Dosage

 

 ProAir HFA  NDC  [address]-07  90 mcg/actuation    Oct 15, 2014        inhale 
2 puffs by Inhalation route every 6 hours as needed  PRN shortness of breath/
cough

 

 Wellbutrin XL  NDC  14177524792  300 MG Orally Once a day           1 tablet 
in the morning

 

 Omeprazole  NDC  [address]-81  20 mg Orally Once a day, NEEDS APPT BEFORE ANY 
FURTHER REFILLS           1 tablet

 

 Acanya  NDC  [address]-44  1.2-2.5 % Externally twice a day  Aug 15, 2016    
    1 application to affected area

 

 Risperidone  NDC  50828124696  1 MG Orally Once a day at bedtime           1 
Tablet

 

 Ibuprofen  NDC  [address]-85  600 MG Orally 2 times a day PRN  Teri 15, 2014 
       1 tablet

 

 Advair Diskus  NDC  03497634181  250-50 MCG/DOSE Inhalation Twice a day, NEEDS 
APPT BEFORE ANY FURTHER REFILLS           1 puff

 

 Loratadine  NDC  97184777539  10 MG Orally Once a day           1 tablet

 

 Meloxicam  NDC  98836914316  15 MG Orally Once a day, NEEDS APPT BEFORE ANY 
FURTHER REFILLS     2016     take 1 tablet



                                                                               
                                                                               
          



Procedures

          





 Procedure  Coding System  Code  Date

 

 INTRAORL-PERIAPICAL 1 FILM 27424  CPT-4    Oct 27, 2016

 

 INTRAORL-PERIAPICAL 1 FILM 08282  CPT-4    Oct 27, 2016

 

 PERIODIC ORAL EXAMINATION  CPT-4    Oct 27, 2016

 

 BITEWINGS - FOUR FILMS  CPT-4    Oct 27, 2016

 

 INTRAORL-PERIAPICAL 1 FILM 21638  CPT-4    Oct 27, 2016



                                                                               
                                       



Results

          No Known Results                                                     
               



Summary Purpose

          eClinicalWorks Submission

## 2018-07-09 NOTE — XMS REPORT
[facility]

 Created on: 2016



Saúl Mccullough

External Reference #: [id number]

: 1990

Sex: Male



Demographics







 Address  [address] [place] [address]  [address]

 

 Home Phone  [phone]

 

 Preferred Language  Unknown

 

 Marital Status  Unknown

 

 Taoism Affiliation  Unknown

 

 Race  Unreported/Refused to Report

 

 Ethnic Group  Not  or 





Author







 Author  LIIGA GONZALEZ

 

 [facility]  eClinicalWorks

 

 Address  Unknown

 

 Phone  Unavailable







Care Team Providers







 Care Team Member Name  Role  Phone

 

 LIGIA GONZALEZ    Unavailable



                                                                



Allergies, Adverse Reactions, Alerts

          





 Substance  Reaction  Event Type

 

 Penicillin V Potassium  anaphylaxis  Drug Allergy



                                                                               
         



Problems

          





 Problem Type  Condition  Code  Onset Dates  Condition Status

 

 Assessment  Wellness examination  Z00.00     Active

 

 Problem  Other general medical examination for administrative purposes  V70.3 
    Active

 

 Assessment  High risk medication use  Z79.899     Active

 

 Assessment  Encounter for immunization  Z23     Active

 

 Problem  Contact dermatitis and other eczema, due to unspecified cause  692.9 
    Active

 

 Problem  Asthma, unspecified, unspecified status  493.90     Active

 

 Problem  Encounter for dental examination  Z01.20     Active

 

 Problem  Other follow-up examination  V67.59     Active

 

 Problem  Need for prophylactic vaccination and inoculation, Influenza  V04.81 
    Active

 

 Problem  Routine general medical examination at health care facility  V70.0   
  Active

 

 Problem  Pleurisy without mention of effusion or current tuberculosis  511.0  
   Active



                                                                               
                                                                               
                              



Medications

          





 Medication  Code System  Code  Instructions  Start Date  End Date  Status  
Dosage

 

 Omeprazole  NDC  [address]-83  20 MG Orally Once a day  Oct 15, 2014        1 
capsule

 

 Risperidone  NDC  [address]-36  1 MG Orally Once a day at bedtime  2015        1 Tablet

 

 Wellbutrin XL  NDC  [address]-23  300 MG Orally Once a day  Oct 15, 2014     
   1 tablet in the morning

 

 Meloxicam  NDC  [address]-72  15 MG Orally Once a day  2014        
take 1 tablet

 

 Carafate  NDC  [address]-07  1 gram Orally Twice a day  Oct 15, 2014        1 
tablet

 

 Loratadine  NDC  [address]-10  10 MG Orally Once a day  Dec 22, 2014        1 
tablet

 

 Advair Diskus  NDC  [address]-41  250-50 MCG/DOSE Inhalation Twice a day  
2015        1 puff

 

 Benzaclin  NDC  [address]-94  1-5 % Externally 2 times a day  March 10, 2015 
       1 yadira by Topical route 2 times per day



                                                                               
                                                                               



Procedures

          





 Procedure  Coding System  Code  Date

 

 COMPLETE CBC W/AUTO DIFF WBC  CPT-4  47364  2016

 

 COMPREHEN METABOLIC PANEL  CPT-4  62289  2016

 

 Preventive Care New Pt. Age 18-39  CPT-4  96629  2016

 

 SINGLE IMMUNIZATION ADMIN  CPT-4  12006  2016

 

 ASSAY THYROID STIM HORMONE  CPT-4  07313  2016

 

 LIPID PANEL  CPT-4  01500  2016

 

 TDAP (BOOSTRIX)  CPT-4  79059  2016

 

 VENIPUNCT, ROUTINE*  CPT-4  24793  2016



                                                                               
                                                                               
          



Vital Signs

          





 Date/Time:  2016

 

 Temperature  97.3 F

 

 Weight  274.2 lbs

 

 Height  75 in

 

 BMI  34.27 Index

 

 Blood Pressure Diastolic  70 mmHg

 

 Blood Pressure Systolic  120 mmHg

 

 Cardiac Monitoring Heart Rate  80 bpm



                                                                    



Results

          





 Name  Result  Date  Reference Range  Unit  Abnormality Flag

 

 ROUTINE VENIPUNCTURE               



                                                                               
         



Immunizations

          





 Vaccine  Administration Date

 

 TDAP (BOOSTRIX)  2016



                                                                    



Summary Purpose

          eClinicalWorks Submission

## 2018-07-09 NOTE — XMS REPORT
[facility]

 Created on: 2017



Saúl Mccullough

External Reference #: [id number]

: 1990

Sex: Male



Demographics







 Address  [address] [place] [address]  [address]

 

 Preferred Language  Unknown

 

 Marital Status  Unknown

 

 Restorationism Affiliation  Unknown

 

 Race  Unknown

 

 Ethnic Group  Unknown





Author







 Author  VERONICA SOLER

 

 Organization  [facility]

 

 Address  [address] [place]  [address]



 

 Phone  [phone]







Care Team Providers







 Care Team Member Name  Role  Phone

 

 VERONICA SOLER  Unavailable  [phone]







PROBLEMS







 Type  Condition  ICD9-CM Code  OSL49-GC Code  Onset Dates  Condition Status  
SNOMED Code

 

 Problem  Right foot pain     M79.671     Active  62609710

 

 Problem  Gastroesophageal reflux disease without esophagitis     K21.9     
Active  894442770

 

 Problem  Dysthymia     F34.1     Active  07016721

 

 Problem  Encounter for dental examination     Z01.20     Active  142503496

 

 Problem  Mild persistent asthma without complication     J45.30     Active  
464279370

 

 Problem  Over weight     E66.3     Active  214689750







ALLERGIES

No Information



SOCIAL HISTORY

Never Assessed



PLAN OF CARE







 Activity  Details









  









 Follow Up  Restorative Reason:







VITAL SIGNS





MEDICATIONS







 Medication  Instructions  Dosage  Frequency  Start Date  End Date  Duration  
Status

 

 Risperidone 1 MG  Orally Once a day at bedtime  1 Tablet              Active

 

 Risperdal 1 MG     ...TAKE ONE (1) TABLET BY MOUTH ONCE DAILY AT BEDTIME...   
           Active

 

 Benzaclin 1-5 %  Externally 2 times a day  1 yadira by Topical route 2 times per 
day  12h           Active

 

 Ibuprofen 600 MG  Orally 2 times a day PRN  1 tablet     15 Elie, 2014        
Active

 

 Meloxicam 15 MG  Orally Once a day, NEEDS APPT BEFORE ANY FURTHER REFILLS  
take 1 tablet           90  Active

 

 Wellbutrin  MG  Orally Once a day in the morning  1 tablet     15 Oct, 
2014     30 days  Active

 

 ProAir HFA 90 mcg/actuation     inhale 2 puffs by Inhalation route every 6 
hours as needed  PRN shortness of breath/cough     15 Oct, 2014        Active

 

 Acanya 1.2-2.5 %  Externally twice a day  1 application to affected area  12h  
15 Aug, 2016        Active

 

 Omeprazole 20 mg  Orally Once a day, NEEDS APPT BEFORE ANY FURTHER REFILLS  1 
tablet              Active

 

 Wellbutrin  MG  Orally Once a day in the morning  1 tablet           30  
Active

 

 BuPROPion HCl (XL) 300 MG     TAKE ONE (1) TABLET EACH MORNING....            
  Active

 

 Advair Diskus 250-50 MCG/DOSE     INHALE ONE (1) PUFF BY MOUTH TWICE DAILY (
APPROX. EVERY 12 HRS)              Active

 

 Loratadine 10 mg  Orally Once a day  1 tablet  24h        30 days  Active







RESULTS

No Results



PROCEDURES







 Procedure  Date Ordered  Result  Body Site

 

 PERIODIC ORAL EXAMINATION  May 24, 2017      

 

 BITEWINGS - FOUR FILMS  May 24, 2017      







IMMUNIZATIONS

No Known Immunizations



MEDICAL (GENERAL) HISTORY







 Type  Description  Date

 

 Medical History  seasonal allergies   

 

 Medical History  acid reflux   

 

 Medical History  anxiety   

 

 Medical History  depression   

 

 Medical History  attention deficit hyperactivity disorder   

 

 Medical History  mild mental retardation   

 

 Medical History  Asthma   

 

 Surgical History  orthopedic surgery-right foot, corrective surgery

## 2018-07-09 NOTE — XMS REPORT
[facility]

 Created on: 10/16/2015



Saúl Mccullough

External Reference #: [id number]

: 1990

Sex: Male



Demographics







 Address  [address] [place]  [address]

 

 Home Phone  [phone]

 

 Preferred Language  Unknown

 

 Marital Status  Unknown

 

 Islam Affiliation  Unknown

 

 Race  Unreported/Refused to Report

 

 Ethnic Group  Not  or 





Author







 Author  VERONICA SOLER

 

 [facility]  eClinicalWorks

 

 Address  Unknown

 

 Phone  Unavailable







Care Team Providers







 Care Team Member Name  Role  Phone

 

 VERONICA SOLER    Unavailable



                                                                



Allergies, Adverse Reactions, Alerts

          





 Substance  Reaction  Event Type

 

 Penicillin V Potassium  anaphylaxis  Drug Allergy



                                                                               
         



Problems

          





 Problem Type  Condition  Code  Onset Dates  Condition Status

 

 Assessment  Dental examination  V72.2     Active

 

 Problem  Asthma, unspecified, unspecified status  493.90     Active

 

 Problem  Routine general medical examination at health care facility  V70.0   
  Active

 

 Problem  Contact dermatitis and other eczema, due to unspecified cause  692.9 
    Active

 

 Problem  Need for prophylactic vaccination and inoculation, Influenza  V04.81 
    Active

 

 Problem  Other general medical examination for administrative purposes  V70.3 
    Active

 

 Problem  Pleurisy without mention of effusion or current tuberculosis  511.0  
   Active

 

 Problem  Other follow-up examination  V67.59     Active



                                                                               
                                                                               



Medications

          





 Medication  Code System  Code  Instructions  Start Date  End Date  Status  
Dosage

 

 Lotrisone  NDC  [address]-43  1-0.05 % Externally Twice a day  Aug 27, 2015  
      1 application to affected area

 

 Carafate  NDC  [address]-55  1 gram    Oct 15, 2014        1 tablet by Oral 
route 2 times per day

 

 ProAir HFA  NDC  [address]-63  90 mcg/actuation    Oct 15, 2014        inhale 
2 puffs by Inhalation route every 6 hours as needed  PRN shortness of breath/
cough

 

 Wellbutrin XL  NDC  [address]-03  150 mg    Oct 15, 2014        2 Tablet by 
Oral route 1 time per day

 

 Loratadine  NDC  [address]-10  10 MG Orally Once a day  Dec 22, 2014        1 
tablet

 

 Meloxicam  NDC  [address]-47  15 mg    2014        take 1 tablet (15 
mg) by oral route once daily

 

 Omeprazole  NDC  [address]-53  20 mg  1 DRC orally once a day  Oct 15, 2014  
      1 capsule by Oral route 1 time per day

 

 Antacid Double Strength  NDC  06744-98361  400-400-40 mg    Aug 16, 2013      
  1 Tablet 4 times per day

 

 Advair Diskus  NDC  [address]-78  250-50 MCG/DOSE Inhalation Twice a day  
2015        1 puff

 

 Tylenol Extra Strength  NDC  [address]-01  500 MG Orally 3 times a day prn 
pain or headache  2014        Take 1-2 tablets

 

 Benzaclin  NDC  [address]-62  1-5 %    March 10, 2015        1 yadira by Topical 
route 2 times per day

 

 Ibuprofen  NDC  [address]-98  600 MG Orally every 6 hrs as needed  Teri 15, 
2014        take 1 tablet

 

 Risperidone  NDC  [address]-48  1 MG Orally Once a day at bedtime  2015        1 Tablet



                                                                               
                                                                               
                                                  



Procedures

          





 Procedure  Coding System  Code  Date

 

 INTRAORL-PERIAPICAL EA ADD FILM  CPT-4    2015

 

 LTD ORAL EVALUATION - PROBLEM FOCUS  CPT-4    2015



                                                                               
                             



Vital Signs

          





 Date/Time:  2015

 

 Blood Pressure Diastolic  69 mmHg

 

 Blood Pressure Systolic  113 mmHg



                                                                              



Results

          No Known Results                                                     
               



Summary Purpose

          eClinicalWorks Submission

## 2018-07-09 NOTE — XMS REPORT
[facility]

 Created on: 10/06/2017



SadiaSaúl

External Reference #: [id number]

: 1990

Sex: Male



Demographics







 Address  [address] [place]  [address]

 

 Preferred Language  Unknown

 

 Marital Status  Unknown

 

 Adventist Affiliation  Unknown

 

 Race  Unknown

 

 Ethnic Group  Unknown





Author







 Author  TING CASTILLO

 

 [facility]

 

 Address  [address] [place]  [address]



 

 Phone  [phone]







Care Team Providers







 Care Team Member Name  Role  Phone

 

 TING CASTILLO  Unavailable  [phone]







PROBLEMS







 Type  Condition  ICD9-CM Code  WSS38-YZ Code  Onset Dates  Condition Status  
SNOMED Code

 

 Problem  Right foot pain     M79.671     Active  52040692

 

 Problem  Gastroesophageal reflux disease without esophagitis     K21.9     
Active  336893033

 

 Problem  Dysthymia     F34.1     Active  26429305

 

 Problem  Encounter for dental examination     Z01.20     Active  183288614

 

 Problem  Mild persistent asthma without complication     J45.30     Active  
362968464

 

 Problem  Over weight     E66.3     Active  773698932







ALLERGIES







 Substance  Reaction  Event Type  Date  Status

 

 Penicillin V Potassium  anaphylaxis  Drug Allergy    Active







SOCIAL HISTORY

Never Assessed



PLAN OF CARE







 Activity  Details









  









 Follow Up  prn Reason:







VITAL SIGNS







 Height  75 in  2017

 

 Weight  289.6 lbs  2017

 

 Temperature  98.4 degrees Fahrenheit  2017

 

 Heart Rate  88 bpm  2017

 

 Respiratory Rate  16   2017

 

 BMI  36.19 kg/m2  2017

 

 Blood pressure systolic  122 mmHg  2017

 

 Blood pressure diastolic  68 mmHg  2017







MEDICATIONS







 Medication  Instructions  Dosage  Frequency  Start Date  End Date  Duration  
Status

 

 Ibuprofen 600 MG  Orally 2 times a day PRN  1 tablet     15 Elie, 2014        
Active

 

 Acanya 1.2-2.5 %  Externally twice a day  1 application to affected area  12h  
15 Aug, 2016        Active

 

 Omeprazole 20 mg  Orally Once a day, NEEDS APPT BEFORE ANY FURTHER REFILLS  1 
tablet              Active

 

 Loratadine 10 mg  Orally Once a day  1 tablet  24h        30 days  Active

 

 ProAir HFA 90 mcg/actuation     inhale 2 puffs by Inhalation route every 6 
hours as needed  PRN shortness of breath/cough     15 Oct, 2014        Active

 

 Risperidone 1 MG  Orally Once a day at bedtime  1 Tablet              Active

 

 Meloxicam 15 MG  Orally Once a day, NEEDS APPT BEFORE ANY FURTHER REFILLS  
take 1 tablet           90  Active

 

 Advair Diskus 250-50 MCG/DOSE     INHALE ONE (1) PUFF BY MOUTH TWICE DAILY (
APPROX. EVERY 12 HRS)              Active

 

 Wellbutrin  MG  Orally Once a day in the morning  1 tablet     15 Oct, 
2014     30 days  Active







RESULTS

No Results



PROCEDURES

No Known procedures



IMMUNIZATIONS

No Known Immunizations



MEDICAL (GENERAL) HISTORY







 Type  Description  Date

 

 Medical History  seasonal allergies   

 

 Medical History  acid reflux   

 

 Medical History  anxiety   

 

 Medical History  depression   

 

 Medical History  attention deficit hyperactivity disorder   

 

 Medical History  mild mental retardation   

 

 Medical History  Asthma   

 

 Surgical History  orthopedic surgery-right foot, corrective surgery

## 2018-07-09 NOTE — XMS REPORT
[facility]

 Created on: 2017



Saúl Mccullough

External Reference #: [id number]

: 1990

Sex: Male



Demographics







 Address  [address] [place] [address]  [address]

 

 Preferred Language  Unknown

 

 Marital Status  Unknown

 

 Yazdanism Affiliation  Unknown

 

 Race  Unknown

 

 Ethnic Group  Unknown





Author







 Author  VERONICA SOLER

 

 Organization  [facility]

 

 Address  [address] [place]  [address]



 

 Phone  [phone]







Care Team Providers







 Care Team Member Name  Role  Phone

 

 VERONICA SOLER  Unavailable  [phone]







PROBLEMS







 Type  Condition  ICD9-CM Code  KEX36-UG Code  Onset Dates  Condition Status  
SNOMED Code

 

 Problem  Mild persistent asthma without complication     J45.30     Active  
[id number]

 

 Problem  Dysthymia     F34.1     Active  60256708

 

 Problem  Over weight     E66.3     Active  223694101

 

 Problem  Encounter for dental examination     Z01.20     Active  801055072

 

 Problem  Gastroesophageal reflux disease without esophagitis     K21.9     
Active  771232546

 

 Problem  Right foot pain     M79.671     Active  27879809







ALLERGIES

Unknown Allergies



SOCIAL HISTORY

No smoking Hx information available



PLAN OF CARE







 Activity  Details









  









 Follow Up  Recall Reason:







VITAL SIGNS





MEDICATIONS

Unknown Medications



RESULTS

No Results



PROCEDURES







 Procedure  Date Ordered  Related Diagnosis  Body Site

 

 AMALGAM-ONE SURFACE PRIMARY/PERM  Dec 20, 2016      

 

 Billing Notes on claim  Dec 20, 2016      

 

 Dental Outreach adjust balance  Dec 20, 2016      







IMMUNIZATIONS

No Known Immunizations

## 2018-07-09 NOTE — XMS REPORT
[facility]

 Created on: 2016



SadiaKatianah

External Reference #: [id number]

: 1990

Sex: Male



Demographics







 Address  [address] [place] [address]  [address]

 

 Home Phone  [phone]

 

 Preferred Language  Unknown

 

 Marital Status  Unknown

 

 Anglican Affiliation  Unknown

 

 Race  Unreported/Refused to Report

 

 Ethnic Group  Not  or 





Author







 Author  LIGIA GONZALEZ

 

 [facility]  eClinicalWorks

 

 Address  Unknown

 

 Phone  Unavailable







Care Team Providers







 Care Team Member Name  Role  Phone

 

 LIGIA GONZALEZ  CP  Unavailable



                                                                



Allergies

          No Known Allergies                                                   
                                     



Problems

          





 Problem Type  Condition  Code  Onset Dates  Condition Status

 

 Problem  Other general medical examination for administrative purposes  V70.3 
    Active

 

 Problem  Contact dermatitis and other eczema, due to unspecified cause  692.9 
    Active

 

 Problem  Asthma, unspecified, unspecified status  493.90     Active

 

 Problem  Encounter for dental examination  Z01.20     Active

 

 Problem  Other follow-up examination  V67.59     Active

 

 Problem  Need for prophylactic vaccination and inoculation, Influenza  V04.81 
    Active

 

 Problem  Routine general medical examination at health care facility  V70.0   
  Active

 

 Problem  Pleurisy without mention of effusion or current tuberculosis  511.0  
   Active



                                                                               
                                                                               



Medications

          





 Medication  Code System  Code  Instructions  Start Date  End Date  Status  
Dosage

 

 Risperidone  NDC  42310585799  1 MG Orally Once a day at bedtime, NEEDS APPT 
BEFORE ANY FURTHER REFILLS           1 Tablet

 

 Sucralfate  NDC  96540453534  1 GM Orally 2 times a day, NEEDS APPT BEFORE ANY 
FURTHER REFILLS           1 tablet

 

 Advair Diskus  NDC  13655247172  250-50 MCG/DOSE Inhalation Twice a day, NEEDS 
APPT BEFORE ANY FURTHER REFILLS           1 puff

 

 Meloxicam  NDC  28733718494  15 MG Orally Once a day, NEEDS APPT BEFORE ANY 
FURTHER REFILLS           take 1 tablet

 

 Omeprazole  NDC  [address]-50  20 mg Orally Once a day, NEEDS APPT BEFORE ANY 
FURTHER REFILLS           1 tablet



                                                                               
                                       



Results

          No Known Results                                                     
               



Summary Purpose

          eClinicalWorks Submission

## 2018-07-09 NOTE — XMS REPORT
[facility]

 Created on: 2018



SadiaSaúl

External Reference #: [id number]

: 1990

Sex: Male



Demographics







 Address  [address] [place]  [address]

 

 Preferred Language  Unknown

 

 Marital Status  Unknown

 

 Latter-day Affiliation  Unknown

 

 Race  Unknown

 

 Ethnic Group  Unknown





Author







 Author  VERONICA SOLER

 

 Organization  [facility]

 

 Address  [address] [place]  [address]



 

 Phone  [phone]







Care Team Providers







 Care Team Member Name  Role  Phone

 

 VERONICA SOLER  Unavailable  [phone]







PROBLEMS







 Type  Condition  ICD9-CM Code  BVQ38-DB Code  Onset Dates  Condition Status  
SNOMED Code

 

 Problem  Dysthymia     F34.1     Active  72279250

 

 Problem  Right foot pain     M79.671     Active  81558866

 

 Problem  Over weight     E66.3     Active  789795445

 

 Problem  Gastroesophageal reflux disease without esophagitis     K21.9     
Active  225641166

 

 Problem  Mild persistent asthma without complication     J45.30     Active  
[id number]







ALLERGIES







 Substance  Reaction  Event Type  Date  Status

 

 Penicillin V Potassium  anaphylaxis  Drug Allergy    Active







ENCOUNTERS







 Encounter  Location  Date  Diagnosis

 

 [facility]  [address] [facility] [id number], [place] [address]24845-
1651  10 2018   

 

 [facility]  [id number] [place] [address] [phone]  13 2018  Mild persistent asthma without complication J45.30 ; Dysthymia F34.1 and 
Gastroesophageal reflux disease without esophagitis K21.9

 

 [facility]  [id number] [place] [id number]  08 2018  Right foot pain M79.671 ; Encounter for screening for lipoid disorders 
Z13.220 and Encounter for screening for cardiovascular disorders Z13.6

 

 [facility]  [address] [facility] [id number], [place] [id number]  15 2018   

 

 [facility]  [id number] [place] [id number]  15 Dionte, 
2018   

 

 [facility]  [address] [id number][place] [phone]  
05 Dec, 2017  Dental examination Z01.20

 

 [facility]  [address] [id number] [place] [address] 
[id number]  05 Dec, 2017  Dental examination Z01.20

 

 [facility]  [address] [facility] [id number], [place] [id number]    Encounter for immunization Z23

 

 [facility]  [address] [id number], [place] [address]
25[address]  16 Aug, 2017   

 

 [facility]  [address] [id number], [place] 
[id number]  15 Aug, 2017  Encounter for dental examination and cleaning without 
abnormal findings Z01.20

 

 [facility]  [address] [id number], [place] [address]46  10 Aug, 2017   

 

 [address] [place] [id number]  10 Aug, 
2017  Right foot pain M79.671 ; Dysthymia F34.1 ; Mild persistent asthma 
without complication J45.30 ; Gastroesophageal reflux disease without 
esophagitis K21.9 ; Encounter for screening for lipoid disorders Z13.220 ; 
Encounter for screening for cardiovascular disorders Z13.6 and Acne vulgaris 
L70.0

 

 [address] [id number], [place] [phone]  
  Dental examination Z01.20

 

 [address] [id number], [place] [phone]  
24 May, 2017  Dental examination Z01.20

 

 [facility]  [address] [id number], [place] 
[id number]  24 May, 2017  Encounter for dental examination and cleaning without 
abnormal findings Z01.20

 

 [facility]  [address] [id number], [place] [phone]    Dysthymia F34.1 ; Mild persistent asthma without complication J45.30 and 
Gastroesophageal reflux disease without esophagitis K21.9

 

 [facility]  [address] [id number], [place] 
[id number]    Encounter for dental examination and cleaning without 
abnormal findings Z01.20

 

 [facility]  [address] [id number], [place] [phone]    Physical exam, routine Z00.00

 

 [address] [id number], [place] [id number]    Dysthymia F34.1

 

 [facility]  [address] [id number], [place] [phone]  
20 Dec, 2016  Dental examination Z01.20

 

 [facility]  [address] [id number], [place] [phone]  01 Dec, 
2016   

 

 [facility]  [address] [id number], [place] [phone]  
27 Oct, 2016  Encounter for dental examination Z01.20

 

 [facility]  [address] [id number], [place] 
[id number]  27 Oct, 2016  Encounter for dental examination and cleaning without 
abnormal findings Z01.20

 

 [facility]  [address] [id number], [place] [phone]  20 Oct, 
2016  Encounter for immunization Z23

 

 [facility]  [address] [id number] [place] [address] [id number]  19 Oct, 
2016   

 

 [facility]  [address] [id number] [place] [address] [id number]  18 Oct, 
2016  Right foot pain M79.671

 

 [facility]  [address] [id number] [place] [address] [id number]  15 Aug, 
2016   

 

 [facility]  [address] [id number] [place] [address] [id number]  10 Aug, 
2016  Dysthymia F34.1

 

 [facility]  [address] [id number], [place] [phone]  08 Aug, 
2016  Mild persistent asthma without complication J45.30 ; Dysthymia F34.1 ; 
Gastroesophageal reflux disease without esophagitis K21.9 ; Right foot pain 
M79.671 and Over weight E66.3

 

 [facility]  [address] [id number], [place] [phone]  05 Aug, 
2016   

 

 [facility]  [address] [id number] [place] [address] [id number]  04 Aug, 
2016   

 

 [facility]  [address] [id number], [place] [phone]     

 

 [facility]  [address] [id number], [place] 
[id number]    Encounter for dental examination and cleaning without 
abnormal findings Z01.20

 

 [facility]  [id number], [place] [phone]     

 

 [facility]  [address] [id number], [place] [id number]  
  Dental examination Z01.20

 

 [facility]  [address] [id number], [place] [phone]  
  Dental examination Z01.20

 

 [facility]  [address] [id number], [place] 
[id number]    Encounter for dental examination and cleaning with 
abnormal findings Z01.21

 

 [facility]  [id number] [place] [address] [id number]  09 Mar, 
2016   

 

 [facility]  [id number] [place] [address] [id number]     

 

 [facility]  [id number] [place] [address] [id number]    Elevated fasting glucose R73.01

 

 [address] [place] [address] [id number]  10 2016  Elevated fasting glucose R73.01

 

 [id number] [place] [address] [id number]  08 2016  Non morbid obesity due to excess calories E66.09 and Weight increase R63.5

 

 [id number] [place] [address] [id number]  05 2016  High risk medication use Z79.899 ; Wellness examination Z00.00 and 
Encounter for immunization Z23

 

 [facility]  [address] [place] [address] [id number]     

 

 [id number] [place] [address] [id number]  21 Dec, 
2015   

 

 [facility]  [address]00565100[place], [place] [address]-
4716     

 

 [id number] [place] [address] [phone]     

 

 [facility]  [address] [id number], [place] [phone]  29 Oct, 
2015   

 

 [facility]  [address] [id number], [place] [phone]  
29 Oct, 2015  Dental examination Z01.20

 

 [facility]  [address] [id number], [place] [phone]  
28 Sep, 2015  Dental examination V72.2

 

 [facility]  [address] [id number], [place] [phone]  28 Aug, 
2015   

 

 [facility]  [address] [id number], [place] [phone]  27 Aug, 
2015  Tinea corporis 110.5

 

 [facility]  [address] [id number], [place] [phone]  04 Aug, 
2015   

 

 [facility]  [address] [id number], [place] [phone]     

 

 [facility]  [address] [id number], [place] 004115453     

 

 [facility]  [address] [id number], [place] [phone]  27 May, 
2015   

 

 [facility]  [address] [id number], [place] [phone]  05 May, 
2015   

 

 [facility]  [address] [id number], [place] [id number]  05 May, 
2015   

 

 [facility]  [address]00565100[place], [place] [address]-
[address]     

 

 [facility]  [address] [facility] [id number], [place] [address]-
2546     

 

 [facility]  [address] [id number], [place] [phone]  10 Mar, 
2015   

 

 [facility]  [address] [facility] [id number], [place] [address]-
7136  10 Mar, 2015   

 

 [facility]  [address] [id number], [place] [address]     

 

 [facility]  [address]00565100[place], [place] [address]-
2546     

 

 [facility]  [address] [facility] [id number] [place] [phone]     

 

 [facility]  [address] [facility] [id number], [place] [address]-
3036     

 

 [facility]  [address] [facility] [id number] [place] [phone]  22 Dec, 
2014   

 

 [facility]  [address] [facility] [id number], [place] [address]-
9706  22 Dec, 2014   

 

 [facility]  [address] [facility] [id number] [place] [phone]     

 

 [facility]  [address] [facility] [id number], [place] [address]-
8136     

 

 [facility]  [address]00565100[place], [place] [address]-
1634  22 Oct, 2014   

 

 [facility]  [address] [facility] [id number], [place] [phone]  22 Oct, 
2014   

 

 [facility]  [address] [facility] [id number], [place] [address]-
4486  15 Oct, 2014   

 

 [facility]  [address] [facility] [id number], [place] [phone]  15 Oct, 
2014   

 

 [facility]  [address] [facility] [id number], [place] [phone]     

 

 [facility]  [address] [facility] [id number][place] [address]-
2546     

 

 [facility]  [address] [facility] [id number], [place] [phone]     

 

 [facility]  [address] [facility] [id number], [place] [address]-
0326     

 

 [facility]  [address] [facility] [id number], [place] [phone]  27 May, 
2014   

 

 [facility]  [address]00565100[place], [place] [address]-
0816  27 May, 2014   

 

 [facility]  [address] [facility] [id number], [place] [id number]  07 May, 
2014   

 

 [facility]  [address] [facility] [id number], [place] [address]-
[address]897  07 May, 2014   

 

 [facility]  [address] [id number], [place] [address]     

 

 [facility]  [address] [facility] [id number], [place] [address]-
0765     

 

 [facility]  [address] [id number], [place] [id number]     

 

 [facility]  [address] [facility] [id number], [place] [address]-
8355     

 

 [facility]  [address] [facility] [id number], [place] [phone]     

 

 [facility]  [address]00565100[place], [place] [address]-
6191     

 

 [facility]  [address] [facility] [id number], [place] [phone]  13 Dec, 
2013   

 

 [facility]  [address] [facility] [id number], [place] [address]-
4558  13 Dec, 2013   

 

 [facility]  [address] [id number], [place] [id number]  02 Dec, 
2013   

 

 [facility]  [address] [id number], [place] [address]-
6669  02 Dec, 2013   

 

 [facility]  [address] [facility] [id number], [place] [phone]  30 Oct, 
2013   

 

 [facility]  [address] [facility] [id number][place] [address]-
2893  30 Oct, 2013   

 

 [facility]  [address] [facility] [id number], [place] [phone]  28 Oct, 
2013   

 

 [facility]  [address] [facility] [id number], [place] [address]-
9954  28 Oct, 2013   

 

 [facility]  [address] [facility] [id number], [place] [address]-
6187  18 Oct, 2013   

 

 [facility]  [address] [facility] [id number] [place] [phone]  18 Oct, 
2013   

 

 [facility]  [address] [id number], [place] [address]-
4966  15 Oct, 2013   

 

 [facility]  [address] [id number] [place] [id number]  15 Oct, 
2013   

 

 [facility]  [address] [id number] [place] [phone]  05 Sep, 
2013   

 

 [facility]  [address] [id number] [place] [id number]  16 Aug, 
2013   

 

 [facility]  [address] [id number] [place] [phone]  12 Aug, 
2013   

 

 [facility]  [address] [facility] [id number] [place] [address]-
6184     

 

 [facility]  [address] [id number], [place] [address]-
4597  24 Dec, 2010   

 

 [facility]  [id number] [place] [address]-
7384  15 Nov, 2010   

 

 [facility]  [address] [id number], [place] [address]-
6953  27 Oct, 2010   

 

 [facility]  [id number] [place] [address]-
7655  19 Oct, 2010   

 

 [facility]  [address]00565100[place], [place] [address]-
4645  19 Oct, 2010   

 

 [facility]  [address] [facility] [id number], [place] [address]-
4328  11 Oct, 2010   

 

 [facility]  [address] [facility] [id number][place] [address]-
3054  15 Dec, 2009   

 

 [facility]  [address] [facility] [id number] [place] [address]-
2789  15 Dec, 2009   

 

 [facility]  [address] [facility] [id number], [place] 339332-
9718  14 Dec, 2009   

 

 [facility]  [address] [facility] [id number] [place] [address]-
3882  07 Dec, 2009   







IMMUNIZATIONS

No Known Immunizations



SOCIAL HISTORY

Never Assessed



REASON FOR VISIT





PLAN OF CARE







 Activity  Details









  









 Follow Up  prn Reason:recare







VITAL SIGNS





MEDICATIONS







 Medication  Instructions  Dosage  Frequency  Start Date  End Date  Duration  
Status

 

 Acanya 1.2-2.5 %  Externally twice a day  1 application to affected area  12h  
15 Aug, 2016        Active

 

 Omeprazole 20 mg  Orally Once a day, NEEDS APPT BEFORE ANY FURTHER REFILLS  1 
tablet              Active

 

 Loratadine 10 mg  Orally Once a day  1 tablet  24h        30  Active

 

 Wellbutrin  MG  Orally Once a day in the morning  1 tablet           30  
Active

 

 Advair Diskus 250-50 MCG/DOSE     INHALE ONE (1) PUFF BY MOUTH TWICE DAILY (
APPROX. EVERY 12 HRS)              Active

 

 Risperidone 1 MG  Orally Once a day at bedtime  1 Tablet              Active

 

 Ibuprofen 600 MG  Orally 2 times a day PRN  1 tablet     15 Elie, 2014        
Active

 

 ProAir HFA 90 mcg/actuation     inhale 2 puffs by Inhalation route every 6 
hours as needed  PRN shortness of breath/cough     15 Oct, 2014        Active

 

 Meloxicam 15 MG  Orally Once a day, NEEDS APPT BEFORE ANY FURTHER REFILLS  
take 1 tablet           90  Active

 

 Wellbutrin  MG  Orally Once a day in the morning  1 tablet     15 Oct, 
2014     30 days  Active







RESULTS

No Results



PROCEDURES







 Procedure  Date Ordered  Result  Body Site

 

 AMALGAM-ONE SURFACE PRIMARY/PERM  2017      

 

 AMALGAM-ONE SURFACE PRIMARY/PERM  2017      

 

 Dental Outreach adjust balance  2017      

 

 Billing Notes on claim  2017      







INSTRUCTIONS





MEDICATIONS ADMINISTERED

No Known Medications



MEDICAL (GENERAL) HISTORY







 Type  Description  Date

 

 Medical History  seasonal allergies   

 

 Medical History  acid reflux   

 

 Medical History  anxiety   

 

 Medical History  depression   

 

 Medical History  attention deficit hyperactivity disorder   

 

 Medical History  mild mental retardation   

 

 Medical History  Asthma   

 

 Surgical History  orthopedic surgery-right foot, corrective surgery

## 2018-07-09 NOTE — XMS REPORT
[facility]

 Created on: 2016



Saúl Mccullough

External Reference #: [id number]

: 1990

Sex: Male



Demographics







 Address  [address] [place] [address]  [address]

 

 Home Phone  [phone]

 

 Preferred Language  Unknown

 

 Marital Status  Unknown

 

 Quaker Affiliation  Unknown

 

 Race  Unreported/Refused to Report

 

 Ethnic Group  Not  or 





Author







 Author  VERONICA SOLER

 

 Organization  [employer]

 

 Address  Unknown

 

 Phone  Unavailable







Care Team Providers







 Care Team Member Name  Role  Phone

 

 VERONICA SOLER  CP  Unavailable



                                                                



Allergies

          No Known Allergies                                                   
                                     



Problems

          





 Problem Type  Condition  Code  Onset Dates  Condition Status

 

 Problem  Other general medical examination for administrative purposes  V70.3 
    Active

 

 Assessment  Dental examination  Z01.20     Active

 

 Problem  Contact dermatitis and other eczema, due to unspecified cause  692.9 
    Active

 

 Problem  Asthma, unspecified, unspecified status  493.90     Active

 

 Problem  Encounter for dental examination  Z01.20     Active

 

 Problem  Other follow-up examination  V67.59     Active

 

 Problem  Need for prophylactic vaccination and inoculation, Influenza  V04.81 
    Active

 

 Problem  Routine general medical examination at health care facility  V70.0   
  Active

 

 Problem  Pleurisy without mention of effusion or current tuberculosis  511.0  
   Active



                                                                               
                                                                               
          



Medications

          No Known Medications                                                 
                                       



Procedures

          





 Procedure  Coding System  Code  Date

 

 BITEWINGS - FOUR FILMS  CPT-4    2016

 

 COMP ORAL EVALUATION - NEW/EST PT  CPT-4    2016



                                                                               
         



Results

          No Known Results                                                     
               



Summary Purpose

          [employer] Submission

## 2018-07-09 NOTE — XMS REPORT
[facility]

 Created on: 10/18/2016



SadiaKatianah

External Reference #: [id number]

: 1990

Sex: Male



Demographics







 Address  [address] [place] [address]  [address]

 

 Home Phone  [phone]

 

 Preferred Language  Unknown

 

 Marital Status  Unknown

 

 Buddhist Affiliation  Unknown

 

 Race  Unreported/Refused to Report

 

 Ethnic Group  Not  or 





Author







 Author  TING CASTILLO

 

 Organization  [employer]

 

 Address  Unknown

 

 Phone  Unavailable







Care Team Providers







 Care Team Member Name  Role  Phone

 

 TING CASTILLO  CP  Unavailable



                                                                



Allergies

          No Known Allergies                                                   
                                     



Problems

          





 Problem Type  Condition  Code  Onset Dates  Condition Status

 

 Problem  Pleurisy without mention of effusion or current tuberculosis  511.0  
   Active

 

 Problem  Asthma, unspecified, unspecified status  493.90     Active

 

 Problem  Routine general medical examination at health care facility  V70.0   
  Active

 

 Problem  Dysthymia  F34.1     Active

 

 Problem  Gastroesophageal reflux disease without esophagitis  K21.9     Active

 

 Problem  Mild persistent asthma without complication  J45.30     Active

 

 Problem  Encounter for dental examination  Z01.20     Active

 

 Problem  Contact dermatitis and other eczema, due to unspecified cause  692.9 
    Active

 

 Problem  Right foot pain  M79.671     Active

 

 Problem  Over weight  E66.3     Active

 

 Assessment  Right foot pain  M79.671     Active

 

 Problem  Other general medical examination for administrative purposes  V70.3 
    Active

 

 Problem  Need for prophylactic vaccination and inoculation, Influenza  V04.81 
    Active

 

 Problem  Other follow-up examination  V67.59     Active



                                                                               
                                                                               
                                                            



Medications

          





 Medication  Code System  Code  Instructions  Start Date  End Date  Status  
Dosage

 

 Ibuprofen  NDC  [address]-27  600 MG Orally 2 times a day PRN  Teri 15, 2014 
       1 tablet



                                                                              



Results

          No Known Results                                                     
               



Summary Purpose

          [employer] Submission

## 2018-07-09 NOTE — XMS REPORT
[facility]

 Created on: 2018



Sadia Saúl

External Reference #: [id number]

: 1990

Sex: Male



Demographics







 Address  [address] [place]  [address]

 

 Preferred Language  Unknown

 

 Marital Status  Unknown

 

 Hinduism Affiliation  Unknown

 

 Race  Unknown

 

 Ethnic Group  Unknown





Author







 Author  RIOS BISHOP

 

 [facility]

 

 Address  [address] [place]  [address]



 

 Phone  [phone]







Care Team Providers







 Care Team Member Name  Role  Phone

 

 RIOS BISHOP  Unavailable  [phone]







PROBLEMS







 Type  Condition  ICD9-CM Code  SOO41-PN Code  Onset Dates  Condition Status  
SNOMED Code

 

 Problem  Dysthymia     F34.1     Active  41339057

 

 Problem  Right foot pain     M79.671     Active  30931382

 

 Problem  Over weight     E66.3     Active  341715314

 

 Problem  Gastroesophageal reflux disease without esophagitis     K21.9     
Active  795678001

 

 Problem  Mild persistent asthma without complication     J45.30     Active  
661998453







ALLERGIES







 Substance  Reaction  Event Type  Date  Status

 

 Penicillin V Potassium  anaphylaxis  Drug Allergy  05 Dec, 2017  Active







ENCOUNTERS







 Encounter  Location  Date  Diagnosis

 

 [facility]  [address] [id number]     

 

 [address] [id number]    Tender scrotum N50.9

 

 [address] [id number]  30 May, 
2018  Diarrhea of presumed infectious origin R19.7

 

 [facility]  [id number] [place] [address] 
[id number]  10 Apr, 2018  Encounter for dental exam and cleaning w/o abnormal 
findings Z01.20

 

 [facility]  [id number] [place] [address] [id number]    Acute nasopharyngitis J00

 

 [address] [id number]  13 2018  Mild persistent asthma without complication J45.30 ; Dysthymia F34.1 and 
Gastroesophageal reflux disease without esophagitis K21.9

 

 [address] [id number]  08 2018  Right foot pain M79.671 ; Encounter for screening for lipoid disorders 
Z13.220 and Encounter for screening for cardiovascular disorders Z13.6

 

 [facility]  [address] [id number][place] [phone]  15 Dionte, 
2018   

 

 [facility]  [address] [id number], [place] [id number]  15 [place]te, 
2018   

 

 [facility]  [address] [id number][place] [phone]  
05 Dec, 2017  Dental examination Z01.20

 

 [facility]  [address] [id number], [place] 
[id number]  05 Dec, 2017  Dental examination Z01.20

 

 [facility]  [address] [id number] [place] [address] [id number]    Encounter for immunization Z23

 

 [facility]  [id number] [place] [phone]
[phone]  16 Aug, 2017   

 

 [facility]  [id number] [place] [address] 
[id number]  15 Aug, 2017  Encounter for dental examination and cleaning without 
abnormal findings Z01.20

 

 [facility]  [address] [id number] [place] [phone]
[phone]  10 Aug, 2017   

 

 [facility]  [address] [place] [address] [id number]  10 Aug, 
2017  Right foot pain M79.671 ; Dysthymia F34.1 ; Mild persistent asthma 
without complication J45.30 ; Gastroesophageal reflux disease without 
esophagitis K21.9 ; Encounter for screening for lipoid disorders Z13.220 ; 
Encounter for screening for cardiovascular disorders Z13.6 and Acne vulgaris 
L70.0

 

 [facility]  [address] [id number], [place] [id number]  
  Dental examination Z01.20

 

 [facility]  [address] [id number][place] [phone]  
24 May, 2017  Dental examination Z01.20

 

 [facility]  [address] [id number], [place] 
[id number]  24 May, 2017  Encounter for dental examination and cleaning without 
abnormal findings Z01.20

 

 [facility]  [address] [id number], [place] [id number]    Dysthymia F34.1 ; Mild persistent asthma without complication J45.30 and 
Gastroesophageal reflux disease without esophagitis K21.9

 

 [facility]  [address] [id number], [place] 
[phone]    Encounter for dental examination and cleaning without 
abnormal findings Z01.20

 

 [facility]  [address] [id number] [place] [address] [phone]    Physical exam, routine Z00.00

 

 [facility]  [address] [id number] [place] [address] [id number]    Dysthymia F34.1

 

 [facility]  [address] [id number] [place] [address] [id number]  
20 Dec, 2016  Dental examination Z01.20

 

 [facility]  [address] [id number] [place] [address] [id number]  01 Dec, 
2016   

 

 [facility]  [address] [id number] [place] [address] 
[id number]  27 Oct, 2016  Encounter for dental examination and cleaning without 
abnormal findings Z01.20

 

 [facility]  [address] [id number] [place] [address] [id number]  
27 Oct, 2016  Encounter for dental examination Z01.20

 

 [facility]  [address] [id number] [place] [address] [id number]  20 Oct, 
2016  Encounter for immunization Z23

 

 [facility]  [address] [id number] [place] [address] [id number]  19 Oct, 
2016   

 

 [facility]  [address] [id number] [place] [address] [id number]  18 Oct, 
2016  Right foot pain M79.671

 

 [facility]  [address] [id number] [place] [address] [id number]  15 Aug, 
2016   

 

 [facility]  [address] [id number] [place] [address] [id number]  10 Aug, 
2016  Dysthymia F34.1

 

 [facility]  [address] [id number] [place] [address] [id number]  08 Aug, 
2016  Mild persistent asthma without complication J45.30 ; Dysthymia F34.1 ; 
Gastroesophageal reflux disease without esophagitis K21.9 ; Right foot pain 
M79.671 and Over weight E66.3

 

 [facility]  [address] [id number], [place] [phone]  05 Aug, 
2016   

 

 [facility]  [id number] [place] [address] [id number]  04 Aug, 
2016   

 

 [facility]  [id number] [place] [address] [id number]     

 

 [facility]  [address] [id number], [place] 
[id number]    Encounter for dental examination and cleaning without 
abnormal findings Z01.20

 

 [facility]  [id number] [place] [address] [id number]     

 

 [address] [id number], [place] [phone]  
  Dental examination Z01.20

 

 [address] [address]  [address] [id number] [place] [address] [phone]  
  Dental examination Z01.20

 

 [facility]  [address] [id number] [place] [address] 
[id number]    Encounter for dental examination and cleaning with 
abnormal findings Z01.21

 

 [address] [id number] [place] [address] [id number]  09 Mar, 
2016   

 

 [address] [id number] [place] [address] [phone]     

 

 [facility]  [id number] [place] [address] [id number]    Elevated fasting glucose R73.01

 

 [address] [id number] [place] [address] [id number]  10 Feb, 
2016  Elevated fasting glucose R73.01

 

 [id number] [place] [address] [id number]  08 2016  Non morbid obesity due to excess calories E66.09 and Weight increase R63.5

 

 [id number] [place] [address] [phone]    High risk medication use Z79.899 ; Wellness examination Z00.00 and 
Encounter for immunization Z23

 

 [facility]  [id number] [place] [address] [id number]     

 

 [facility]  [address] [id number] [place] [address] [id number]  21 Dec, 
2015   

 

 [facility]  [id number] [place] [address] [phone]
[phone]     

 

 [facility]  [id number] [place] [address] [id number]     

 

 [facility]  [id number] [place] [address] [id number]  29 Oct, 
2015   

 

 [facility]  [address] [id number][place] [phone]  
29 Oct, 2015  Dental examination Z01.20

 

 [facility]  [id number] [place] [address] [id number]  
28 Sep, 2015  Dental examination V72.2

 

 [facility]  [id number], [place] [phone]  28 Aug, 
2015   

 

 [facility]  [id number] [place] [address] [id number]  27 Aug, 
2015  Tinea corporis 110.5

 

 [facility]  [id number] [place] [address] [phone]  04 Aug, 
2015   

 

 [facility]  [address] [id number] [place] [address] [id number]     

 

 [facility]  [id number] [place] [address] [id number]     

 

 [facility]  [address] [id number][place] [phone]  27 May, 
2015   

 

 [facility]  [id number][place] [phone]  05 May, 
2015   

 

 [facility]  [id number], [place] [phone]  05 May, 
2015   

 

 [facility]  [address]00565100[place] [phone]-
6369     

 

 [facility] [place] [facility]  [address] [facility] [id number], [place] [address]-
2430     

 

 [facility]  [address] [facility] [id number] [place] [phone]  10 Mar, 
2015   

 

 [facility] [place] [facility]  [address] [facility] [id number], [place] [address]-
[address]  10 Mar, 2015   

 

 [facility]  [address] [facility] [id number] [place] [phone]     

 

 [facility] [place] [facility]  [address] [facility] [id number] [place] [address]-
4863     

 

 [facility]  [address] [facility] [id number] [place] [phone]     

 

 [facility] [place] [facility]  [address] [id number], [place] [address]-
0688     

 

 [facility]  [address] [id number] [place] [phone]  22 Dec, 
2014   

 

 [facility] [place] [facility]  [address] [facility] [id number], [place] [address]-
2367  22 Dec, 2014   

 

 [facility]  [address] [facility] [id number][place] [phone]     

 

 [facility] [place] [facility]  [address]00565100[place], [place] [address]-
3776     

 

 [facility] [place] [facility]  [address] [facility] [id number], [place] [address]-
5837  22 Oct, 2014   

 

 [facility]  [address] [facility] [id number], [place] [phone]  22 Oct, 
2014   

 

 [facility] [place] [facility]  [address] [facility] [id number], [place] [address]-
6847  15 Oct, 2014   

 

 [facility]  [address] [facility] [id number], [place] [phone]  15 Oct, 
2014   

 

 [facility]  [address] [facility] [id number], [place] [phone]     

 

 [facility] [place] [facility]  [address] [facility] [id number], [place] [address]-
2156     

 

 [facility]  [address] [id number] [place] [phone]     

 

 [facility] [place] [facility]  [address] [facility] [id number], [place] [address]-
2546     

 

 [facility]  [address] [id number] [place] [phone]  27 May, 
2014   

 

 [facility] [place] [facility]  [address] [facility] [id number], [place] [address]-
0906  27 May, 2014   

 

 [facility]  [address] [id number] [place] [phone]  07 May, 
2014   

 

 [facility] [place] [facility]  [address] [facility] [id number], [place] [address]
2546  07 May, 2014   

 

 [facility]  [address] [id number] [place] [phone]     

 

 [facility] [place] [facility]  [address] [facility] [id number], [place] [address]-
7696     

 

 [facility]  [address] [id number], [place] [address]     

 

 [facility] [place] [facility]  [address] [facility] [id number], [place] [address]-
6396     

 

 [facility]  [address] [id number], [place] [phone]     

 

 [facility] [place] [facility]  [address] [facility] [id number], [place] [address]-
9146     

 

 [facility]  [address] [id number], [place] [phone]  13 Dec, 
2013   

 

 [facility] [place] [facility]  [address] [facility] [id number][place] [address]-
5486  13 Dec, 2013   

 

 [facility]  [address] [id number], [place] [phone]  02 Dec, 
2013   

 

 [facility] [place] [facility]  [address] [facility] [id number], [place] [address]-
5216  02 Dec, 2013   

 

 [facility]  [address] [id number], [place] [address]  30 Oct, 
2013   

 

 [facility] [place] [facility]  [address] [facility] [id number], [place] [address]-
1851  30 Oct, 2013   

 

 [facility]  [address] [id number] [place] [phone]  28 Oct, 
2013   

 

 [facility] [place] [facility]  [address] [id number] [place] [address]-
9400  28 Oct, 2013   

 

 [facility] [place] [facility]  [address] [facility] [id number], [place] [address]-
6080  18 Oct, 2013   

 

 [facility]  [address] [id number] [place] [phone]  18 Oct, 
2013   

 

 [facility] [place] [facility]  [address] [facility] [id number], [place] [address]-
8916  15 Oct, 2013   

 

 [facility]  [address] [id number] [place] [id number]  15 Oct, 
2013   

 

 [facility]  [address] [id number], [place] [phone]  05 Sep, 
2013   

 

 [facility]  [address] [id number], [place] [id number]  16 Aug, 
2013   

 

 [facility]  [address] [id number] [place] [phone]  12 Aug, 
2013   

 

 [facility] [place] [facility]  [address] [facility] [id number], [place] [address]-
9265     

 

 [facility] [place] [facility]  [address] [facility] [id number], [place] [address]-
1265  24 Dec, 2010   

 

 [facility] [place] [facility]  [address] [facility] [id number], [place] [address]-
4713  15 Nov, 2010   

 

 [facility] [place] [facility]  [address] [facility] [id number], [place] 652562-
8631  27 Oct, 2010   

 

 [facility] [place] [facility]  [address] [facility] [id number], [place] 328726-
9187  19 Oct, 2010   

 

 [facility] [place] [facility]  [address]00565100[place], [place] [address]-
5922  19 Oct, 2010   

 

 [facility] [place] [facility]  [address] [facility] [id number] [place] [address]-
2985  11 Oct, 2010   

 

 [facility]  [address] [facility] [id number], [place] [address]-
3377  15 Dec, 2009   

 

 [facility]  [address] [facility] [id number], [place] [address]-
8435  15 Dec, 2009   

 

 [facility]  [address] [facility] [id number], [place] [address]-
6097  14 Dec, 2009   

 

 [facility]  [address] [facility] [id number], [place] [address]-
5829  07 Dec, 2009   







IMMUNIZATIONS

No Known Immunizations



SOCIAL HISTORY

Never Assessed



REASON FOR VISIT

ADULT OUTREACH CLASS [facility]



PLAN OF CARE







 Activity  Details









  









 Follow Up  3 Months Reason:ON SITE RECALL







VITAL SIGNS





MEDICATIONS







 Medication  Instructions  Dosage  Frequency  Start Date  End Date  Duration  
Status

 

 Acetaminophen 500 mg  Orally 3 times a day  2 capsules as needed  8h  10 Aug, 
2017        Active

 

 Meloxicam 15 MG  Orally Once a day, NEEDS APPT BEFORE ANY FURTHER REFILLS  
take 1 tablet           90  Active

 

 Risperidone 1 MG  Orally Once a day at bedtime  1 Tablet              Active

 

 ProAir  (90 Base) mcg/act     inhale 2 puffs by Inhalation route every 
6 hours as needed  PRN shortness of breath/cough     15 Oct, 2014        Active

 

 Wellbutrin  mg  Orally Once a day in the morning  1 tablet           30  
Active

 

 Epiduo 0.1-2.5 %  Externally 2 times a day  as directed  12h        30  Active

 

 Omeprazole 20 mg  Orally Once a day, NEEDS APPT BEFORE ANY FURTHER REFILLS  1 
tablet              Active

 

 Loratadine 10 mg  Orally Once a day  1 tablet  24h        90  Active

 

 Advair Diskus 250-50 mcg/dose     INHALE ONE (1) PUFF BY MOUTH TWICE DAILY (
APPROX. EVERY 12 HRS)              Active







RESULTS

No Results



PROCEDURES







 Procedure  Date Ordered  Result  Body Site

 

 PERIODIC ORAL EXAMINATION  Dec 08, 2017      

 

 BITEWINGS - FOUR FILMS  Dec 08, 2017      







INSTRUCTIONS





MEDICATIONS ADMINISTERED

No Known Medications



MEDICAL (GENERAL) HISTORY







 Type  Description  Date

 

 Medical History  seasonal allergies   

 

 Medical History  acid reflux   

 

 Medical History  anxiety   

 

 Medical History  depression   

 

 Medical History  attention deficit hyperactivity disorder   

 

 Medical History  mild mental retardation   

 

 Medical History  Asthma   

 

 Surgical History  orthopedic surgery-right foot, corrective surgery

## 2018-07-09 NOTE — XMS REPORT
[facility]

 Created on: 10/19/2016



Sadia Saúl

External Reference #: [id number]

: 1990

Sex: Male



Demographics







 Address  [address] [place] [address]  [address]

 

 Home Phone  [phone]

 

 Preferred Language  Unknown

 

 Marital Status  Unknown

 

 [employer] Affiliation  Unknown

 

 Race  Unreported/Refused to Report

 

 Ethnic Group  Not  or 





Author







 Author  TING CASTILLO

 

 Organization  [employer]

 

 Address  Unknown

 

 Phone  Unavailable







Care Team Providers







 Care Team Member Name  Role  Phone

 

 TING CASTILLO  CP  Unavailable



                                                                



Allergies

          No Known Allergies                                                   
                                     



Problems

          





 Problem Type  Condition  Code  Onset Dates  Condition Status

 

 Problem  Pleurisy without mention of effusion or current tuberculosis  511.0  
   Active

 

 Problem  Asthma, unspecified, unspecified status  493.90     Active

 

 Problem  Routine general medical examination at health care facility  V70.0   
  Active

 

 Problem  Dysthymia  F34.1     Active

 

 Problem  Gastroesophageal reflux disease without esophagitis  K21.9     Active

 

 Problem  Mild persistent asthma without complication  J45.30     Active

 

 Problem  Encounter for dental examination  Z01.20     Active

 

 Problem  Contact dermatitis and other eczema, due to unspecified cause  692.9 
    Active

 

 Problem  Right foot pain  M79.671     Active

 

 Problem  Over weight  E66.3     Active

 

 Problem  Other general medical examination for administrative purposes  V70.3 
    Active

 

 Problem  Need for prophylactic vaccination and inoculation, Influenza  V04.81 
    Active

 

 Problem  Other follow-up examination  V67.59     Active



                                                                               
                                                                               
                                                  



Medications

          





 Medication  Code System  Code  Instructions  Start Date  End Date  Status  
Dosage

 

 Ibuprofen  NDC  [address]-70  600 MG Orally 2 times a day PRN  Teri 15, 2014 
       1 tablet



                                                                              



Results

          No Known Results                                                     
               



Summary Purpose

          [employer] Submission

## 2018-07-09 NOTE — XMS REPORT
[facility]

 Created on: 2016



Sadia, Seth

External Reference #: [id number]

: 1990

Sex: Male



Demographics







 Address  [address] [place] [address]  [address]

 

 Home Phone  [phone]

 

 Preferred Language  Unknown

 

 Marital Status  Unknown

 

 [employer] Affiliation  Unknown

 

 Race  Unreported/Refused to Report

 

 Ethnic Group  Not  or 





Author







 Author  TING CASTILLO

 

 Organization  [employer]

 

 Address  Unknown

 

 Phone  Unavailable







Care Team Providers







 Care Team Member Name  Role  Phone

 

 TING CASTILLO  CP  Unavailable



                                                                



Allergies

          No Known Allergies                                                   
                                     



Problems

          





 Problem Type  Condition  Code  Onset Dates  Condition Status

 

 Problem  Other general medical examination for administrative purposes  V70.3 
    Active

 

 Problem  Contact dermatitis and other eczema, due to unspecified cause  692.9 
    Active

 

 Problem  Asthma, unspecified, unspecified status  493.90     Active

 

 Problem  Encounter for dental examination  Z01.20     Active

 

 Problem  Other follow-up examination  V67.59     Active

 

 Problem  Need for prophylactic vaccination and inoculation, Influenza  V04.81 
    Active

 

 Problem  Routine general medical examination at health care facility  V70.0   
  Active

 

 Problem  Pleurisy without mention of effusion or current tuberculosis  511.0  
   Active



                                                                               
                                                                               



Medications

          





 Medication  Code System  Code  Instructions  Start Date  End Date  Status  
Dosage

 

 Sucralfate  NDC  21864467393  1 GM Orally 2 times a day, NEEDS APPT BEFORE ANY 
FURTHER REFILLS           1 tablet

 

 Omeprazole  NDC  [address]-25  20 mg Orally Once a day, NEEDS APPT BEFORE ANY 
FURTHER REFILLS           1 tablet

 

 Advair Diskus  NDC  95607566547  250-50 MCG/DOSE Inhalation Twice a day, NEEDS 
APPT BEFORE ANY FURTHER REFILLS           1 puff

 

 Meloxicam  NDC  88787545250  15 MG Orally Once a day, NEEDS APPT BEFORE ANY 
FURTHER REFILLS           take 1 tablet



                                                                               
                             



Results

          No Known Results                                                     
               



Summary Purpose

          [employer] Submission

## 2018-07-09 NOTE — XMS REPORT
[facility]

 Created on: 2015



SadiaKatianah

External Reference #: [id number]

: 1990

Sex: Male



Demographics







 Address  [address] [place] [address]  [address]

 

 Home Phone  [phone]

 

 Preferred Language  Unknown

 

 Marital Status  Unknown

 

 Sikh Affiliation  Unknown

 

 Race  Unreported/Refused to Report

 

 Ethnic Group  Not  or 





Author







 Author  SHERIN GARNER

 

 Organization  [employer]

 

 Address  Unknown

 

 Phone  Unavailable







Care Team Providers







 Care Team Member Name  Role  Phone

 

 SHERIN GARNER  CP  Unavailable



                                                                



Allergies, Adverse Reactions, Alerts

          





 Substance  Reaction  Event Type

 

 Penicillin V Potassium  anaphylaxis  Drug Allergy



                                                                               
         



Problems

          





 Problem Type  Condition  Code  Onset Dates  Condition Status

 

 Problem  Other general medical examination for administrative purposes  V70.3 
    Active

 

 Assessment  Dental examination  Z01.20     Active

 

 Problem  Contact dermatitis and other eczema, due to unspecified cause  692.9 
    Active

 

 Problem  Asthma, unspecified, unspecified status  493.90     Active

 

 Problem  Encounter for dental examination  Z01.20     Active

 

 Problem  Other follow-up examination  V67.59     Active

 

 Problem  Need for prophylactic vaccination and inoculation, Influenza  V04.81 
    Active

 

 Problem  Routine general medical examination at health care facility  V70.0   
  Active

 

 Problem  Pleurisy without mention of effusion or current tuberculosis  511.0  
   Active



                                                                               
                                                                               
          



Medications

          





 Medication  Code System  Code  Instructions  Start Date  End Date  Status  
Dosage

 

 Ibuprofen  NDC  [address]-69  600 MG Orally every 6 hrs as needed  Teri 15, 
2014        take 1 tablet

 

 Clindamycin HCl  NDC  [address]-06               not defined

 

 Loratadine  NDC  [address]-10  10 MG Orally Once a day  Dec 22, 2014        1 
tablet

 

 Sucralfate  NDC  [address]-24               not defined

 

 Clotrimazole-Betamethasone  NDC  [address]-02               not defined

 

 Advair HFA  NDC  [address]-94               not defined



                                                                               
                                                           



Procedures

          





 Procedure  Coding System  Code  Date

 

 VERTICAL BITEWINGS - 7 TO 8 FILMS  CPT-4    Oct 29, 2015

 

 PROPHYLAXIS - ADULT  CPT-4    Oct 29, 2015

 

 PERIODIC ORAL EXAMINATION  CPT-4    Oct 29, 2015



                                                                               
                                       



Vital Signs

          





 Date/Time:  Oct 29, 2015

 

 Blood Pressure Diastolic  69 mmHg

 

 Blood Pressure Systolic  109 mmHg

 

 Cardiac Monitoring Heart Rate  83 bpm



                                                                              



Results

          No Known Results                                                     
               



Summary Purpose

          eClinicalWorks Submission

## 2018-07-10 NOTE — OPERATIVE REPORT
DATE OF SERVICE:  07/09/2018



PREOPERATIVE DIAGNOSIS:

Incarcerated right inguinal hernia.



POSTOPERATIVE DIAGNOSES:

1.  Incarcerated right inguinal hernia with indirect inguinal hernia.

2.  Cord lipoma.



PROCEDURES:

1.  Laparoscopic right inguinal herniorrhaphy with mesh placement.

2.  Excision of cord lipoma.



SURGEON:

NAZARIO HYATT DO



FIRST ASSISTANT:

MARY PARSON DO



ANESTHESIA:

General endotracheal tube.



SPECIMENS:

None.



BLOOD LOSS:

Minimal.



FLUIDS:

Per anesthesia.



POSTOPERATIVE CONDITION:

Stable.



INDICATION FOR PROCEDURE:

The patient is a 28-year-old male who has pain in the inguinal region diagnosed

as incarcerated right inguinal hernia with a fullness into the scrotum.



FINDINGS:

The patient had a very large indirect inguinal hernia with omentum stuck in

there.  He also had a cord lipoma.



PROCEDURE NOTE:

After informed consent was obtained, the patient was brought to the operating

room, placed on table in supine position, sterilely prepped and draped in normal

fashion.  Local lidocaine was used to infiltrate the skin above the umbilicus. 

Made an incision with #11 blade, carried down through the skin and into

subcutaneous tissue, then deepened down to subcutaneous tissue with Bovie

electrocautery down to the fascia.  Fascia incised with Bovie electrocautery,

then bluntly entered the abdomen, swept a finger around and then placed 0 Vicryl

figure-of-eight suture, placed 11 mm trocar port under direct visualization and

created pneumoperitoneum.  Then placed 2 more ports in normal fashion using

local lidocaine, 11 blade for stab incision and then a da Daniel 8 mm ports about

5 to 10 cm away from the midline port, one on either side.  Once these were

placed then brought the robot in and docked the robot.  The patient had been

placed in Trendelenburg position.  Upon entry, noted a large amount of omentum

in the inguinal hernia, able to carefully start pulling this out.  Once this was

completely removed then elected to score the peritoneum starting from the medial

umbilical ligament and then across laterally about 15 cm.  Used the scissors on

cautery to score this and then carefully started freeing up the preperitoneum

going down finding the pubic tubercle and then going laterally across pulling

the peritoneum down trying to stay preperitoneal with the stay away from the

nerves and vascular structure.  This took a while because he had a very friable

peritoneum and he had a very large indirect hernia sac.  Took over 2 hours just

to try and dissect out the large indirect inguinal hernia sac trying to get it

off the cord and cord structures.  Finally, had to cut across this large sac to

just remove it and left distal portion down into the scrotum.  Able to finally

remove this inguinal hernia sac off the cord and cord structures, taking down

the attachment to the spermatic cord and then able to completely continue

freeing this peritoneum down medially.  I was able to get the critical view of

the myopectineal orifice noted in the pubic tubercle, Forrest ligament.  Also

encountered a large cord lipoma, able to carefully tease this off and then used

some cautery to remove this.  Once it was removed and the peritoneal reflection

was completely pulled down, then placed a 11 x 16 3D Bard Echo mesh, pushed it

down into the abdomen, sutured one to the pubic tubercle with a 3-0 Vicryl and

sutured laterally up on the abdominal wall with another 3-0 Vicryl suture and

then medially again along the rectus muscle with a 3-0 Vicryl to stay above the

ASIS and out of this area.  The mesh laid down nicely and then at this point

free from the peritoneum, peritoneum was well below this and also had gotten 2

cm below the pubic tubercle.  At this point, then used V-Loc suture running from

lateral to the medial tying and then running back a few to tie to itself. 

Unfortunately, there were some holes in the peritoneum.  These were closed with

some figure-of-eight 3-0 Vicryl suture.  At the end of the case, there was no

bleeding.  This area looked good.  Mesh looked good and allowed the

pneumoperitoneum to escape gently watching the mesh lay out nicely behind the

peritoneum.  Undocked the robot and then removed all ports under direct

visualization, allowed pneumoperitoneum to escape.  Closed the umbilical

incision, closed the fascia with 0 Vicryl suture previously placed.  Copiously

irrigated all incisions with normal saline, closing the midline incision with 3

interrupted 4-0 undyed Monocryl subcuticular stitches.  Closed the two 8 mm

incisions with a single 4-0 undyed Monocryl subcuticular stitch.  Area was

cleaned and dried.  Dermabond placed as well as dressings.  The patient then

transferred to recovery room in stable condition.  Sponge, instrument and needle

count correct at the end of the case.  Dr. Parson assisted in this case helping

to place ports and then helped identify anatomy and then closed the incisions.





Job ID: [id number]

DocumentID: [id number]

Dictated Date:  07/09/2018 17:26:53

Transcription Date: 07/09/2018 23:21:44

Dictated By: NAZARIO HYATT DO

## 2018-08-01 NOTE — PROGRESS NOTE-STANDARD
Standard Progress Note


Progress Notes/Assess & Plan


Date Seen by Provider:  Jul 9, 2018


Time Seen by Provider:  09:20


Progress/Assessment & Plan


Addendum to Anesthesia Record (7-9-18)





On anesthesia record from 7-9-18, midazolam 2 mg IV was given at 0920 for 

anxiolysis on entry to the OR but was inadvertantly not documented. This 

midazolam 2 mg IV should have been documented on the anesthesia record, but 

this should serve as documentation of midazolam 2 mg IV given.











NILESH SMITH DO Aug 1, 2018 11:02

## 2020-10-12 NOTE — XMS REPORT
[facility]

 Created on: 2015



Saúl Mccullough

External Reference #: [id number]

: 1990

Sex: Male



Demographics







 Address  [address] [place] [address]  [address]

 

 Home Phone  [phone]

 

 Preferred Language  Unknown

 

 Marital Status  Unknown

 

 Evangelical Affiliation  Unknown

 

 Race  Unreported/Refused to Report

 

 Ethnic Group  Not  or 





Author







 Author  LIGIA GONZALEZ

 

 [facility]  [employer]

 

 Address  Unknown

 

 Phone  Unavailable







Care Team Providers







 Care Team Member Name  Role  Phone

 

 LIGIA GONZALEZ    Unavailable



                                                                



Allergies, Adverse Reactions, Alerts

          





 Substance  Reaction  Event Type

 

 Penicillin V Potassium  anaphylaxis  Drug Allergy



                                                                               
         



Problems

          





 Problem Type  Condition  ICD-9 Code  Onset Dates  Condition Status

 

 Assessment  Tinea corporis  110.5     Active

 

 Problem  Asthma, unspecified, unspecified status  493.90     Active

 

 Problem  Routine general medical examination at health care facility  V70.0   
  Active

 

 Problem  Contact dermatitis and other eczema, due to unspecified cause  692.9 
    Active

 

 Problem  Need for prophylactic vaccination and inoculation, Influenza  V04.81 
    Active

 

 Problem  Other general medical examination for administrative purposes  V70.3 
    Active

 

 Problem  Pleurisy without mention of effusion or current tuberculosis  511.0  
   Active

 

 Problem  Other follow-up examination  V67.59     Active



                                                                               
                                                                               



Medications

          





 Medication  Code System  Code  Instructions  Start Date  End Date  Status  
Dosage

 

 Risperidone  NDC  [address]-86  1 MG Orally Once a day at bedtime  2015        1 Tablet

 

 Benzaclin  NDC  [address]-49  1-5 %    March 10, 2015        1 yadira by Topical 
route 2 times per day

 

 Advair Diskus  NDC  [address]-76  250-50 MCG/DOSE Inhalation Twice a day  
2015        1 puff

 

 Meloxicam  NDC  [address]-04  15 mg    2014        take 1 tablet (15 
mg) by oral route once daily

 

 Tylenol Extra Strength  NDC  [address]-30  500 MG Orally 3 times a day prn 
pain or headache  2014        Take 1-2 tablets

 

 Omeprazole  NDC  [address]-17  20 mg  1 DRC orally once a day  Oct 15, 2014  
      1 capsule by Oral route 1 time per day

 

 Carafate  NDC  [address]-40  1 gram    Oct 15, 2014        1 tablet by Oral 
route 2 times per day

 

 Antacid Double Strength  NDC  74008-36635  400-400-40 mg    Aug 16, 2013      
  1 Tablet 4 times per day

 

 Lotrisone  NDC  [address]-28  1-0.05 % Externally Twice a day  Aug 27, 2015  
      1 application to affected area

 

 ProAir HFA  NDC  [address]-45  90 mcg/actuation    Oct 15, 2014        inhale 
2 puffs by Inhalation route every 6 hours as needed  PRN shortness of breath/
cough

 

 Wellbutrin XL  NDC  [address]-91  150 mg    Oct 15, 2014        2 Tablet by 
Oral route 1 time per day

 

 Loratadine  NDC  [address]-10  10 MG Orally Once a day  Dec 22, 2014        1 
tablet

 

 Ibuprofen  NDC  [address]-17  600 mg    Teri 15, 2014        take 1 tablet (
600 mg) by oral route every 6 hours as needed with food



                                                                               
                                                                               
                                                  



Procedures

          





 Procedure  Coding System  Code  Date

 

 Office Visit, Est Pt., Level 3  CPT-4  97857  Aug 27, 2015



                                                                               
                   



Vital Signs

          





 Date/Time:  Aug 27, 2015

 

 Temperature  98.0 F

 

 Weight  270.0 lbs

 

 Height  75 in

 

 BMI  33.74 Index

 

 Blood Pressure Diastolic  80 mmHg

 

 Blood Pressure Systolic  120 mmHg

 

 Cardiac Monitoring Heart Rate  88 bpm



                                                                              



Results

          No Known Results                                                     
               



Summary Purpose

          eClinicalWorks Submission No

## 2022-05-12 ENCOUNTER — HOSPITAL ENCOUNTER (EMERGENCY)
Dept: HOSPITAL 75 - ER | Age: 32
Discharge: HOME | End: 2022-05-12
Payer: MEDICAID

## 2022-05-12 VITALS — DIASTOLIC BLOOD PRESSURE: 82 MMHG | SYSTOLIC BLOOD PRESSURE: 134 MMHG

## 2022-05-12 VITALS — WEIGHT: 315 LBS | HEIGHT: 75.2 IN | BODY MASS INDEX: 39.17 KG/M2
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
## 2022-05-12 DIAGNOSIS — M54.50: Primary | ICD-10-CM

## 2022-05-12 PROCEDURE — 72100 X-RAY EXAM L-S SPINE 2/3 VWS: CPT

## 2022-05-12 NOTE — DIAGNOSTIC IMAGING REPORT
INDICATION: Low back pain.



No priors.



FINDINGS: The lumbar vertebral statures are normal. Alignment is

anatomic. There is deformity of well corticated and smoothly

marginated at the mid to upper coccyx this likely is chronic, but

correlate with any new lower tailbone pain. There is mild

spondylosis and disc space narrowing at the L5-S1 level where

there is facet arthrosis. No acute appearing abnormality.



IMPRESSION:

1. Some mild lumbosacral junction degenerative disease. Normal

lumbar alignment. Normal lumbar statures. No acute lumbar

pathology.

2. Deformity of the coccyx of uncertain acuity correlate with any

new injury or pain to the lower tailbone.



Dictated by: 



  Dictated on workstation # [id number]

## 2022-05-12 NOTE — ED BACK PAIN
General


Chief Complaint:  Back Problems


Stated Complaint:  BACK PAIN


Nursing Triage Note:  


This patient arrives via EMS non ambulatory stating that he has lower back pain.


Source of Information:  Patient


Exam Limitations:  No Limitations





History of Present Illness


Date Seen by Provider:  May 12, 2022


Time Seen by Provider:  08:05


Initial Comments


42-year-old male with past medical history of ADHD, developmental delay, anxiety

coming in due to low back pain.  Monday he was picking up something heavy, felt 

the pain in his lower back, has been constant, sharp, and more on the right 

lower back.  Has taken Tylenol which is helped somewhat.  Last took it last 

night.  Has never had pain like this before.  Says he felt too much pain to be 

able to get up out of bed this morning to go to the bathroom, so his family 

members called 911.  He denies any fall, had normal bowel and bladder yesterday.

 Was walking yesterday with pain.  He is otherwise denying any other acute 

complaints.





Allergies and Home Medications


Allergies


Coded Allergies:  


     Penicillins (Verified  Allergy, Unknown, 18)





Patient Home Medication List


Home Medication List Reviewed:  Yes


Albuterol Sulfate (Proair Hfa) 1 Puff Puff, 2 PUFF IH Q4H PRN for SHORTNESS OF 

BREATH, (Reported)


   Entered as Reported by: MIK ROCA on 18 1328


Bupropion HCl (Wellbutrin Xl) 300 Mg Tab.er.24h, 300 MG PO DAILY, (Reported)


   Entered as Reported by: MIK ROCA on 18 1328


Fluticasone/Salmeterol (Advair 250-50 Diskus) 1 Each Blst.w.dev, 1 PUFF IH BID, 

(Reported)


   Entered as Reported by: MIK ROCA on 18 1328


Hydrocodone Bit/Acetaminophen (HYDROcodone/APAP 10/325 TABLET) 1 Each Tablet, 1 

TAB PO Q6H


   Prescribed by: NAZARIO HYATT on 18 1338


Loratadine (Loratadine) 10 Mg Tablet, 10 MG PO HS, (Reported)


   Entered as Reported by: MIK ROCA on 18 1328


Meloxicam (Meloxicam) 15 Mg Tablet, 15 MG PO DAILY, (Reported)


   Entered as Reported by: MIK ROCA on 18 1328


Omeprazole (Omeprazole) 20 Mg Capsule.dr, 20 MG PO DAILY, (Reported)


   Entered as Reported by: MIK ROCA on 18 132


Risperidone (Risperidone) 1 Mg Tablet, 1 MG PO HS, (Reported)


   Entered as Reported by: MIK ROCA on 18 1328





Review of Systems


Constitutional:  No chills, No fever


EENTM:  No blurred vision


Respiratory:  no symptoms reported


Cardiovascular:  no symptoms reported


Gastrointestinal:  no symptoms reported


Genitourinary:  no symptoms reported


Musculoskeletal:  back pain


Skin:  no symptoms reported


Psychiatric/Neurological:  No Symptoms Reported





All Other Systems Reviewed


Negative Unless Noted:  Yes





Past Medical-Social-Family Hx


Patient Social History


Tobacco Use?:  No


Smoking Status:  Never a Smoker


Smokeless Tobacco Frequency:  Never a User


Use of E-Cig and/or Vaping dev:  No


Use of E-Cig and/or Vaping Cristóbal:  Never a User


Substance use?:  No


Alcohol Use?:  No





Immunizations Up To Date


First/Initial COVID19 Vaccinat:  Unknown





Seasonal Allergies


Seasonal Allergies:  Yes





Past Medical History


Surgeries:  Yes (club foot, inguinal hernia repair)


Asthma


Developmental Disorder


Abdominal Hernia, Gastroesophageal Reflux


ADD/ADHD, Anxiety, Depression





Physical Exam


Vital Signs





Vital Signs - First Documented








 22





 08:04


 


Temp 36.9


 


Pulse 79


 


Resp 20


 


B/P (MAP) 134/82 (99)


 


O2 Delivery Room Air





Capillary Refill : Less Than 3 Seconds


Height, Weight, BMI


Height: 6'4.00"


Weight: 308lbs. 0.0oz. 139.339025aj; 39.00 BMI


Method:


General Appearance:  No Apparent Distress, WD/WN


HEENT:  PERRL/EOMI, Normal ENT Inspection, Pharynx Normal


Neck:  Full Range of Motion, Normal Inspection, Non Tender, Supple


Cardiovascular:  Regular Rate, Rhythm, No Edema, Normal Peripheral Pulses


Respiratory:  Chest Non Tender, Lungs Clear, Normal Breath Sounds, No Accessory 

Muscle Use, No Respiratory Distress


Gastrointestinal:  Normal Bowel Sounds, Non Tender, Soft; No Distended, No 

Guarding


Back:  Normal Inspection, No CVA Tenderness, No Vertebral Tenderness, Other 

(paravertebral tenderness right lower back, negative straight leg raise and 

reverse straight leg raise)


Extremity:  Normal Capillary Refill, Normal Inspection, Normal Range of Motion, 

Non Tender, No Calf Tenderness, No Pedal Edema


Neurologic/Psychiatric:  Alert, Oriented x3, No Motor/Sensory Deficits, Normal 

Mood/Affect, Other (2+ patellar reflex, 5 out of 5 strength with hip flexion, 

knee extension and flexion, foot dorsiflexion and plantarflexion)


Skin:  Normal Color, Warm/Dry


Lymphatic:  No Adenopathy





Progress/Results/Core Measures


Results/Orders


My Orders





Orders - MAUDE BROWER MD


Lumbar Spine - 2-3 Views (22 08:21)


Acetaminophen  Tablet (Tylenol  Tablet) (22 08:30)


Cyclobenzaprine Tablet (Flexeril Tablet) (22 08:21)


Lidocaine 4% Patch (Salonpas 4% Patch) (22 08:21)


Gabapentin Capsule/Tablet (Neurontin Cap (22 08:30)


Ketorolac Injection (Toradol Injection) (22 08:27)





Medications Given in ED





Current Medications








 Medications  Dose


 Ordered  Sig/Nikko


 Route  Start Time


 Stop Time Status Last Admin


Dose Admin


 


 Acetaminophen  1,000 mg  ONCE  ONCE


 PO  22 08:30


 22 08:31 DC 22 08:36


1,000 MG


 


 Gabapentin  300 mg  ONCE  ONCE


 PO  22 08:30


 22 08:31 DC 22 08:36


300 MG








Vital Signs/I&O











 22





 08:04 08:36


 


Temp 36.9 36.6


 


Pulse 79 


 


Resp 20 


 


B/P (MAP) 134/82 (99) 


 


O2 Delivery Room Air 














Blood Pressure Mean:                    99











Progress


Progress Note :  


Progress Note


32-year-old male with above history coming in due to nontraumatic low back pain.

 ABCs were intact and vitals were stable on presentation.  He has no midline 

tenderness, no fever, no red flags such as IV drug use or history of diabetes.  

Normal neuro exam today.  He urinated and afterwards I did a point-of-care 

ultrasound showing a completely decompressed bladder which is reassuring.  Given

symptomatic medications including Toradol, Tylenol, Flexeril, gabapentin, 

lidocaine patch.


Patient feeling better on repeat evaluation.  X-ray negative for any acute 

findings.  There was an order finding in his coccyx, however he has no 

tenderness there, and has not had any falls.  I believe he is stable for 

discharge with outpatient follow-up.  He was sent home with strict return 

precautions





Diagnostic Imaging





   Diagonstic Imaging:  Xray (lumbar spine)


Comments


                 [facility]


                                [place], [place]





NAME:   NANCY MCBRIDE


[facility] REC#:   [id number]


ACCOUNT#:   [id number]


PT STATUS:   REG ER


:   1990


PHYSICIAN:   MAUDE BROWER MD


ADMIT DATE:   22/ER


                                   ***Draft***


Date of Exam:22





LUMBAR SPINE - 2-3 VIEWS








INDICATION: Low back pain.





No priors.





FINDINGS: The lumbar vertebral statures are normal. Alignment is


anatomic. There is deformity of well corticated and smoothly


marginated at the mid to upper coccyx this likely is chronic, but


correlate with any new lower tailbone pain. There is mild


spondylosis and disc space narrowing at the L5-S1 level where


there is facet arthrosis. No acute appearing abnormality.





IMPRESSION:


1. Some mild lumbosacral junction degenerative disease. Normal


lumbar alignment. Normal lumbar statures. No acute lumbar


pathology.


2. Deformity of the coccyx of uncertain acuity correlate with any


new injury or pain to the lower tailbone.





  Dictated on workstation # [id number]








Dict:   22


Trans:   22


 [id number]





Interpreted by:     PRISCILLA LOPEZ


Electronically signed by:





Departure


Impression





   Primary Impression:  


   Low back pain


   Qualified Codes:  M54.50 - Low back pain, unspecified


Disposition:  01 HOME, SELF-CARE


Condition:  Improved





Departure-Patient Inst.


Decision time for Depature:  09:25


Referrals:  


TING CASTILLO (PCP)


Primary Care Physician








SCHWAB,TERRY D MD


Patient Instructions:  Low Back Pain ED





Add. Discharge Instructions:  


I have sent prescription ibuprofen and Tylenol to your pharmacy.  You can also 

try the lidocaine patch and the muscle relaxer.  Do not drive if you are taking 

the muscle relaxer.  Follow-up with the bone doctor here in [facility], Dr. Schwab if 

things are not improving in the next couple weeks.


Scripts


Acetaminophen (Tylenol Extra Strength) 500 Mg Tablet


1000 MG PO Q6H for 5 Days, #40 TAB


   Prov: MAUDE BROWER MD         22 


Lidocaine (Lidocaine 5% Patch) 5 % Adh..patch


1 EACH TP Q12H PRN for Neuropathic pain MDD 2 for 7 Days, #14 PATCH


   2 patches max for 12 hours, then 12 hours patch-free period.


   Prov: MAUDE BROWER MD         22 


Ibuprofen (Ibuprofen) 600 Mg Tablet


600 MG PO Q6H PRN for PAIN-MILD for 7 Days, #28 TAB


   Prov: MAUDE BROWER MD         22 


Cyclobenzaprine HCl (Cyclobenzaprine HCl) 10 Mg Tablet


10 MG PO BID PRN for SPASMS for 5 Days, #10 TAB


   Prov: MAUDE BROWER MD         22


Work/School Note:  Work Release Form   Date Seen in the Emergency Department:  

May 12, 2022


   Return to Work:  May 13, 2022


   Restrictions:  No Restrictions











MAUDE BROWER MD          May 12, 2022 08:21